# Patient Record
Sex: FEMALE | Race: WHITE | Employment: UNEMPLOYED | URBAN - NONMETROPOLITAN AREA
[De-identification: names, ages, dates, MRNs, and addresses within clinical notes are randomized per-mention and may not be internally consistent; named-entity substitution may affect disease eponyms.]

---

## 2015-10-07 LAB — DIABETIC RETINOPATHY: NEGATIVE

## 2018-09-24 ENCOUNTER — OFFICE VISIT (OUTPATIENT)
Dept: SURGERY | Age: 58
End: 2018-09-24
Payer: COMMERCIAL

## 2018-09-24 VITALS
HEART RATE: 86 BPM | TEMPERATURE: 98 F | DIASTOLIC BLOOD PRESSURE: 85 MMHG | HEIGHT: 69 IN | WEIGHT: 204.2 LBS | OXYGEN SATURATION: 99 % | BODY MASS INDEX: 30.24 KG/M2 | SYSTOLIC BLOOD PRESSURE: 131 MMHG

## 2018-09-24 DIAGNOSIS — K22.70 BARRETT'S ESOPHAGUS WITHOUT DYSPLASIA: ICD-10-CM

## 2018-09-24 DIAGNOSIS — Z12.11 ENCOUNTER FOR SCREENING COLONOSCOPY: ICD-10-CM

## 2018-09-24 DIAGNOSIS — K21.00 GERD WITH ESOPHAGITIS: Primary | ICD-10-CM

## 2018-09-24 PROCEDURE — 99204 OFFICE O/P NEW MOD 45 MIN: CPT | Performed by: TRANSPLANT SURGERY

## 2018-09-24 RX ORDER — RANITIDINE 150 MG/1
300 TABLET ORAL DAILY
COMMUNITY
End: 2020-02-11

## 2018-09-24 RX ORDER — LEVOTHYROXINE SODIUM 137 UG/1
137 CAPSULE ORAL DAILY
COMMUNITY
End: 2019-05-30 | Stop reason: SDUPTHER

## 2018-09-24 RX ORDER — LOSARTAN POTASSIUM 50 MG/1
50 TABLET ORAL DAILY
COMMUNITY
End: 2019-05-30 | Stop reason: SDUPTHER

## 2018-09-24 RX ORDER — OMEPRAZOLE 20 MG/1
20 CAPSULE, DELAYED RELEASE ORAL DAILY
COMMUNITY
Start: 2018-07-18 | End: 2019-05-30 | Stop reason: SDUPTHER

## 2018-09-24 RX ORDER — ATORVASTATIN CALCIUM 20 MG/1
20 TABLET, FILM COATED ORAL NIGHTLY
COMMUNITY
End: 2019-05-30 | Stop reason: SDUPTHER

## 2018-09-24 ASSESSMENT — ENCOUNTER SYMPTOMS
CONSTIPATION: 1
EYE DISCHARGE: 0
DIARRHEA: 1
ABDOMINAL PAIN: 0
SHORTNESS OF BREATH: 0
DOUBLE VISION: 0
BLOOD IN STOOL: 0
NAUSEA: 0
HEMOPTYSIS: 0
HEARTBURN: 0
BACK PAIN: 0
SPUTUM PRODUCTION: 0
ORTHOPNEA: 0
PHOTOPHOBIA: 0
VOMITING: 0
EYE PAIN: 0

## 2018-09-24 NOTE — PROGRESS NOTES
Hepatobiliary and Pancreatic Surgery Attending History and Physical    Patient's Name/Date of Birth: Lerry Litten / 1960 (37 y.o.)    Date: September 24, 2018     CC: Spain's esophagus with age related screening colonoscopy    HPI:  Patient is a very pleasant 59year old female whom has a history of GERD with esophagitis that has led to [place]'s esophagus. She had an EGD three years ago and is now due for another EGD. She also had a colonoscopy 5 years ago and also due for that. She has a history of diverticulosis but no diverticulitis. She denies a family history of colon cancer. She does have alternating bouts of diarrhea and constipation and this normal for her. She states that she has never had a esophagram.    Past Medical History:   Diagnosis Date    High cholesterol     Hypertension     Hyperthyroidism     Type 2 diabetes mellitus without complication (HCC)        Past Surgical History:   Procedure Laterality Date    COLONOSCOPY  2015    FOOT SURGERY      x3    UPPER GASTROINTESTINAL ENDOSCOPY  2015       Current Outpatient Prescriptions   Medication Sig Dispense Refill    omeprazole (PRILOSEC) 20 MG delayed release capsule Take 20 mg by mouth daily      Insulin Glargine (LANTUS SC) Inject 55 Units into the skin daily      ranitidine (ZANTAC) 150 MG tablet Take 150 mg by mouth 2 times daily      levothyroxine (SYNTHROID) 112 MCG tablet Take 112 mcg by mouth Daily      losartan (COZAAR) 50 MG tablet Take 50 mg by mouth daily       No current facility-administered medications for this visit.         Allergies   Allergen Reactions    Keflex [Cephalexin]      Patient can not remember     Sulfa Antibiotics Hives       Family History   Problem Relation Age of Onset    Breast Cancer Mother     Breast Cancer Sister     Other Brother        Social History     Social History    Marital status:      Spouse name: N/A    Number of children: N/A    Years of education: N/A

## 2018-09-25 ENCOUNTER — TELEPHONE (OUTPATIENT)
Dept: HEMATOLOGY | Age: 58
End: 2018-09-25

## 2018-09-25 NOTE — TELEPHONE ENCOUNTER
I called the patient's insurance [address] [place] [address] and I spoke with Angelena Romberg authorization rep., he stated no authorization for CPT codes 94153, 20016, and 43304. So I called surgery scheduling and I spoke with Oscar Anaya she scheduled the patient at [facility] on 10/8/18 at 1:00 pm for colonoscopy and egd. I then called radiology and I spoke with Guerrero Solomon she scheduled the patient at [facility] on 10/01/18 at 8:30am stating the patient must arrive at 8:00 am.  I spoke with the patient and I informed her that she is scheduled at [facility] on 10/1/18 at 8:00am for her Esophagram, and then I shared with her that her colonoscopy and egd are scheduled at [facility] on 10/8/18 at 1:00 pm and PAT will call with further instructions. Patient confirmed.      Electronically signed by Topher De Guzman on 9/25/18 at 8:20 AM

## 2018-10-01 ENCOUNTER — HOSPITAL ENCOUNTER (OUTPATIENT)
Dept: GENERAL RADIOLOGY | Age: 58
Discharge: HOME | End: 2018-10-03
Payer: COMMERCIAL

## 2018-10-01 DIAGNOSIS — K22.70 BARRETT'S ESOPHAGUS WITHOUT DYSPLASIA: ICD-10-CM

## 2018-10-01 DIAGNOSIS — K21.00 GERD WITH ESOPHAGITIS: ICD-10-CM

## 2018-10-01 PROCEDURE — 6360000004 HC RX CONTRAST MEDICATION: Performed by: RADIOLOGY

## 2018-10-01 PROCEDURE — 2500000003 HC RX 250 WO HCPCS: Performed by: RADIOLOGY

## 2018-10-01 PROCEDURE — 6370000000 HC RX 637 (ALT 250 FOR IP): Performed by: RADIOLOGY

## 2018-10-01 PROCEDURE — 74220 X-RAY XM ESOPHAGUS 1CNTRST: CPT

## 2018-10-01 PROCEDURE — A4641 RADIOPHARM DX AGENT NOC: HCPCS | Performed by: RADIOLOGY

## 2018-10-01 RX ORDER — DULOXETIN HYDROCHLORIDE 60 MG/1
60 CAPSULE, DELAYED RELEASE ORAL NIGHTLY
COMMUNITY
End: 2019-03-26

## 2018-10-01 RX ORDER — AMITRIPTYLINE HYDROCHLORIDE 10 MG/1
10 TABLET, FILM COATED ORAL NIGHTLY
COMMUNITY
End: 2019-03-26

## 2018-10-01 RX ADMIN — ANTACID/ANTIFLATULENT 1 EACH: 380; 550; 10; 10 GRANULE, EFFERVESCENT ORAL at 08:14

## 2018-10-01 RX ADMIN — BARIUM SULFATE 176 G: 960 POWDER, FOR SUSPENSION ORAL at 08:10

## 2018-10-01 RX ADMIN — BARIUM SULFATE 140 ML: 980 POWDER, FOR SUSPENSION ORAL at 08:15

## 2018-10-01 NOTE — PROGRESS NOTES
[facility] PRE-ADMISSION TESTING INSTRUCTIONS    The Preadmission Testing patient is instructed accordingly using the following criteria (check applicable):    ARRIVAL INSTRUCTIONS:  [x] Parking the day of Surgery is located in the Main Entrance lot. Upon entering the door, make an immediate right to the surgery reception desk    [x] Complimentary [address] Park[address] is available Monday through Friday 6 am to 6 pm    [x] Bring photo ID and insurance card    [] Bring in a copy of Living will or Durable Power of  papers. [x] Please be sure to arrange for responsible adult to provide transportation to and from the hospital    [x] Please arrange for responsible adult to be with you for the 24 hour period post procedure due to having anesthesia      GENERAL INSTRUCTIONS:    [x] Nothing by mouth after midnight, including gum, candy, mints or water    [x] You may brush your teeth, but do not swallow any water    [x] Take medications as instructed with 1-2 oz of water    [x] Stop herbal supplements and vitamins 5 days prior to procedure    [x] Follow preop dosing of blood thinners per physician instructions    [] Take 1/2 dose of evening insulin, but no insulin after midnight    [x] No oral diabetic medications after midnight    [x] If diabetic and have low blood sugar or feel symptomatic, take 1-2oz apple juice only    [] Bring inhalers day of surgery    [] Bring C-PAP/ Bi-Pap day of surgery    [] Bring urine specimen day of surgery    [] Shower or bath with soap, lather and rinse well, AM of Surgery, no lotion, powders or creams to surgical site    [x] Follow bowel prep as instructed per surgeon    [x] No tobacco products within 24 hours of surgery     [x] No alcohol or illegal drug use within 24 hours of surgery.     [x] Jewelry, body piercing's, eyeglasses, contact lenses and dentures are not permitted into surgery (bring cases)      [x] Please do not wear any nail polish, make up or hair

## 2018-10-08 ENCOUNTER — ANESTHESIA (OUTPATIENT)
Dept: ENDOSCOPY | Age: 58
End: 2018-10-08
Payer: COMMERCIAL

## 2018-10-08 ENCOUNTER — ANESTHESIA EVENT (OUTPATIENT)
Dept: ENDOSCOPY | Age: 58
End: 2018-10-08
Payer: COMMERCIAL

## 2018-10-08 ENCOUNTER — HOSPITAL ENCOUNTER (OUTPATIENT)
Age: 58
Setting detail: OUTPATIENT SURGERY
Discharge: HOME | End: 2018-10-08
Attending: TRANSPLANT SURGERY | Admitting: TRANSPLANT SURGERY
Payer: COMMERCIAL

## 2018-10-08 VITALS
DIASTOLIC BLOOD PRESSURE: 63 MMHG | SYSTOLIC BLOOD PRESSURE: 95 MMHG | RESPIRATION RATE: 17 BRPM | OXYGEN SATURATION: 98 %

## 2018-10-08 VITALS
WEIGHT: 200 LBS | HEIGHT: 69 IN | DIASTOLIC BLOOD PRESSURE: 58 MMHG | SYSTOLIC BLOOD PRESSURE: 103 MMHG | OXYGEN SATURATION: 97 % | BODY MASS INDEX: 29.62 KG/M2 | TEMPERATURE: 97.8 F | RESPIRATION RATE: 16 BRPM | HEART RATE: 72 BPM

## 2018-10-08 PROBLEM — K22.70 BARRETT'S ESOPHAGUS WITHOUT DYSPLASIA: Status: ACTIVE | Noted: 2018-10-08

## 2018-10-08 LAB — METER GLUCOSE: 148 MG/DL (ref 70–110)

## 2018-10-08 PROCEDURE — 3609009500 HC COLONOSCOPY DIAGNOSTIC OR SCREENING: Performed by: TRANSPLANT SURGERY

## 2018-10-08 PROCEDURE — 88305 TISSUE EXAM BY PATHOLOGIST: CPT

## 2018-10-08 PROCEDURE — 88342 IMHCHEM/IMCYTCHM 1ST ANTB: CPT

## 2018-10-08 PROCEDURE — 45378 DIAGNOSTIC COLONOSCOPY: CPT | Performed by: TRANSPLANT SURGERY

## 2018-10-08 PROCEDURE — 3609012400 HC EGD TRANSORAL BIOPSY SINGLE/MULTIPLE: Performed by: TRANSPLANT SURGERY

## 2018-10-08 PROCEDURE — 7100000010 HC PHASE II RECOVERY - FIRST 15 MIN: Performed by: TRANSPLANT SURGERY

## 2018-10-08 PROCEDURE — 2580000003 HC RX 258: Performed by: NURSE ANESTHETIST, CERTIFIED REGISTERED

## 2018-10-08 PROCEDURE — 2709999900 HC NON-CHARGEABLE SUPPLY: Performed by: TRANSPLANT SURGERY

## 2018-10-08 PROCEDURE — 3700000000 HC ANESTHESIA ATTENDED CARE: Performed by: TRANSPLANT SURGERY

## 2018-10-08 PROCEDURE — 43239 EGD BIOPSY SINGLE/MULTIPLE: CPT | Performed by: TRANSPLANT SURGERY

## 2018-10-08 PROCEDURE — 82962 GLUCOSE BLOOD TEST: CPT

## 2018-10-08 PROCEDURE — 3700000001 HC ADD 15 MINUTES (ANESTHESIA): Performed by: TRANSPLANT SURGERY

## 2018-10-08 PROCEDURE — 6360000002 HC RX W HCPCS: Performed by: NURSE ANESTHETIST, CERTIFIED REGISTERED

## 2018-10-08 PROCEDURE — 2500000003 HC RX 250 WO HCPCS: Performed by: NURSE ANESTHETIST, CERTIFIED REGISTERED

## 2018-10-08 PROCEDURE — 7100000011 HC PHASE II RECOVERY - ADDTL 15 MIN: Performed by: TRANSPLANT SURGERY

## 2018-10-08 RX ORDER — SODIUM CHLORIDE 9 MG/ML
INJECTION, SOLUTION INTRAVENOUS CONTINUOUS PRN
Status: DISCONTINUED | OUTPATIENT
Start: 2018-10-08 | End: 2018-10-08 | Stop reason: SDUPTHER

## 2018-10-08 RX ORDER — 0.9 % SODIUM CHLORIDE 0.9 %
10 VIAL (ML) INJECTION PRN
Status: DISCONTINUED | OUTPATIENT
Start: 2018-10-08 | End: 2018-10-08 | Stop reason: HOSPADM

## 2018-10-08 RX ORDER — FENTANYL CITRATE 50 UG/ML
INJECTION, SOLUTION INTRAMUSCULAR; INTRAVENOUS PRN
Status: DISCONTINUED | OUTPATIENT
Start: 2018-10-08 | End: 2018-10-08 | Stop reason: SDUPTHER

## 2018-10-08 RX ORDER — 0.9 % SODIUM CHLORIDE 0.9 %
10 VIAL (ML) INJECTION EVERY 12 HOURS SCHEDULED
Status: DISCONTINUED | OUTPATIENT
Start: 2018-10-08 | End: 2018-10-08 | Stop reason: HOSPADM

## 2018-10-08 RX ORDER — PROPOFOL 10 MG/ML
INJECTION, EMULSION INTRAVENOUS PRN
Status: DISCONTINUED | OUTPATIENT
Start: 2018-10-08 | End: 2018-10-08 | Stop reason: SDUPTHER

## 2018-10-08 RX ADMIN — GLUCAGON HYDROCHLORIDE 0.5 MG: KIT at 12:27

## 2018-10-08 RX ADMIN — PROPOFOL 50 MG: 10 INJECTION, EMULSION INTRAVENOUS at 12:45

## 2018-10-08 RX ADMIN — PROPOFOL 50 MG: 10 INJECTION, EMULSION INTRAVENOUS at 12:38

## 2018-10-08 RX ADMIN — PROPOFOL 50 MG: 10 INJECTION, EMULSION INTRAVENOUS at 12:20

## 2018-10-08 RX ADMIN — FENTANYL CITRATE 50 MCG: 50 INJECTION, SOLUTION INTRAMUSCULAR; INTRAVENOUS at 12:14

## 2018-10-08 RX ADMIN — FENTANYL CITRATE 50 MCG: 50 INJECTION, SOLUTION INTRAMUSCULAR; INTRAVENOUS at 12:24

## 2018-10-08 RX ADMIN — SODIUM CHLORIDE: 9 INJECTION, SOLUTION INTRAVENOUS at 11:31

## 2018-10-08 RX ADMIN — PROPOFOL 50 MG: 10 INJECTION, EMULSION INTRAVENOUS at 12:30

## 2018-10-08 RX ADMIN — PROPOFOL 100 MG: 10 INJECTION, EMULSION INTRAVENOUS at 12:14

## 2018-10-08 ASSESSMENT — PULMONARY FUNCTION TESTS
PIF_VALUE: 0

## 2018-10-08 ASSESSMENT — PAIN SCALES - GENERAL
PAINLEVEL_OUTOF10: 0

## 2018-10-08 ASSESSMENT — PAIN DESCRIPTION - DESCRIPTORS: DESCRIPTORS: CRAMPING

## 2018-10-08 ASSESSMENT — PAIN - FUNCTIONAL ASSESSMENT: PAIN_FUNCTIONAL_ASSESSMENT: 0-10

## 2018-10-08 NOTE — ANESTHESIA PRE PROCEDURE
Allergies: Allergies   Allergen Reactions    Sulfa Antibiotics Hives, Itching and Other (See Comments)     Big time redness    Keflex [Cephalexin] Itching and Rash       Problem List:  There is no problem list on file for this patient. Past Medical History:        Diagnosis Date    Chronic back pain     Constipation     Diarrhea     High cholesterol     Hypertension     Hyperthyroidism     Neuropathy     Type 2 diabetes mellitus without complication ([facility] [address]        Past Surgical History:        Procedure Laterality Date    COLONOSCOPY  2015    FOOT SURGERY      x3    UPPER GASTROINTESTINAL ENDOSCOPY  2015       Social History:    Social History   Substance Use Topics    Smoking status: Never Smoker    Smokeless tobacco: Never Used    Alcohol use No                                Counseling given: Not Answered      Vital Signs (Current):   Vitals:    10/01/18 1153 10/08/18 1055   BP:  136/85   Pulse:  86   Resp:  14   Temp:  97.5 °F (36.4 °C)   TempSrc:  Temporal   SpO2:  99%   Weight: 200 lb (90.7 kg) 200 lb (90.7 kg)   Height: 5' 9\" (1.753 m) 5' 9\" (1.753 m)                                              BP Readings from Last 3 Encounters:   10/08/18 136/85   09/24/18 131/85       NPO Status: Time of last liquid consumption: 1800                        Time of last solid consumption: 0700                        Date of last liquid consumption: 10/07/18                        Date of last solid food consumption: 10/07/18    BMI:   Wt Readings from Last 3 Encounters:   10/08/18 200 lb (90.7 kg)   09/24/18 204 lb 3.2 oz (92.6 kg)     Body mass index is 29.53 kg/m². CBC: No results found for: WBC, RBC, HGB, HCT, MCV, RDW, PLT    CMP: No results found for: NA, K, CL, CO2, BUN, CREATININE, GFRAA, AGRATIO, LABGLOM, GLUCOSE, PROT, CALCIUM, BILITOT, ALKPHOS, AST, ALT    POC Tests: No results for input(s): POCGLU, POCNA, POCK, POCCL, POCBUN, POCHEMO, POCHCT in the last 72 hours.     Coags: No

## 2018-10-09 NOTE — ANESTHESIA POSTPROCEDURE EVALUATION
Department of Anesthesiology  Postprocedure Note    Patient: Ricky Kuhn  MRN: [id number]  YOB: 1960  Date of evaluation: 10/9/2018  Time:  10:11 AM     Procedure Summary     Date:  10/08/18 Room / Location:  [facility] / [facility] ENDOSCOPY    Anesthesia Start:  1147 Anesthesia Stop:  5527    Procedures:       COLONOSCOPY SCREENING POSSIBLE BIOPSY POSSIBLE POLYPECTOMY POSSIBLE CLIPPING (N/A )      EGD BIOPSY (N/A ) Diagnosis:  (CARIAS'S ESOPHAGUS WITHOUT DYSPLASIA SCREENING )    Surgeon:  Erik Ray MD Responsible Provider:  April Desir DO    Anesthesia Type:  MAC ASA Status:  3          Anesthesia Type: MAC    London Phase I: London Score: 10    London Phase II: London Score: 10    Last vitals: Reviewed and per EMR flowsheets.        Anesthesia Post Evaluation    Patient location during evaluation: PACU  Patient participation: complete - patient participated  Level of consciousness: awake and alert  Pain score: 1  Airway patency: patent  Nausea & Vomiting: no nausea and no vomiting  Complications: no  Cardiovascular status: hemodynamically stable  Respiratory status: acceptable  Hydration status: euvolemic

## 2018-11-05 ENCOUNTER — OFFICE VISIT (OUTPATIENT)
Dept: SURGERY | Age: 58
End: 2018-11-05
Payer: COMMERCIAL

## 2018-11-05 VITALS
OXYGEN SATURATION: 98 % | BODY MASS INDEX: 30.48 KG/M2 | SYSTOLIC BLOOD PRESSURE: 138 MMHG | HEIGHT: 69 IN | WEIGHT: 205.8 LBS | HEART RATE: 101 BPM | TEMPERATURE: 97.8 F | DIASTOLIC BLOOD PRESSURE: 87 MMHG

## 2018-11-05 DIAGNOSIS — K22.70 BARRETT'S ESOPHAGUS DETERMINED BY BIOPSY: Primary | ICD-10-CM

## 2018-11-05 PROCEDURE — 99212 OFFICE O/P EST SF 10 MIN: CPT | Performed by: TRANSPLANT SURGERY

## 2019-01-03 LAB
AVERAGE GLUCOSE: NORMAL
HBA1C MFR BLD: 10.5 %

## 2019-03-26 ENCOUNTER — OFFICE VISIT (OUTPATIENT)
Dept: ENDOCRINOLOGY | Age: 59
End: 2019-03-26
Payer: COMMERCIAL

## 2019-03-26 VITALS
HEART RATE: 109 BPM | OXYGEN SATURATION: 97 % | WEIGHT: 200.2 LBS | RESPIRATION RATE: 16 BRPM | SYSTOLIC BLOOD PRESSURE: 138 MMHG | BODY MASS INDEX: 29.65 KG/M2 | DIASTOLIC BLOOD PRESSURE: 82 MMHG | HEIGHT: 69 IN

## 2019-03-26 DIAGNOSIS — E55.9 VITAMIN D DEFICIENCY: ICD-10-CM

## 2019-03-26 DIAGNOSIS — E03.9 HYPOTHYROIDISM, UNSPECIFIED TYPE: ICD-10-CM

## 2019-03-26 DIAGNOSIS — E10.8 TYPE 1 DIABETES MELLITUS WITH COMPLICATION (HCC): Primary | ICD-10-CM

## 2019-03-26 LAB
BUN BLDV-MCNC: NORMAL MG/DL
CALCIUM SERPL-MCNC: 9.3 MG/DL
CHLORIDE BLD-SCNC: NORMAL MMOL/L
CO2: NORMAL MMOL/L
CREAT SERPL-MCNC: 1.09 MG/DL
GFR CALCULATED: NORMAL
GLUCOSE BLD-MCNC: NORMAL MG/DL
POTASSIUM SERPL-SCNC: 4.5 MMOL/L
SODIUM BLD-SCNC: 138 MMOL/L

## 2019-03-26 PROCEDURE — 99204 OFFICE O/P NEW MOD 45 MIN: CPT | Performed by: INTERNAL MEDICINE

## 2019-03-26 RX ORDER — INSULIN GLARGINE 100 [IU]/ML
55 INJECTION, SOLUTION SUBCUTANEOUS EVERY MORNING
COMMUNITY
End: 2019-03-26 | Stop reason: SDUPTHER

## 2019-03-26 RX ORDER — GLUCOSAMINE HCL/CHONDROITIN SU 500-400 MG
CAPSULE ORAL
Qty: 250 STRIP | Refills: 5 | Status: SHIPPED | OUTPATIENT
Start: 2019-03-26 | End: 2019-11-13

## 2019-03-26 RX ORDER — INSULIN GLARGINE 100 [IU]/ML
55 INJECTION, SOLUTION SUBCUTANEOUS EVERY MORNING
Qty: 6 VIAL | Refills: 12 | Status: SHIPPED | OUTPATIENT
Start: 2019-03-26 | End: 2019-06-27 | Stop reason: SDUPTHER

## 2019-03-27 DIAGNOSIS — E10.8 TYPE 1 DIABETES MELLITUS WITH COMPLICATION (HCC): ICD-10-CM

## 2019-04-03 ENCOUNTER — TELEPHONE (OUTPATIENT)
Dept: ENDOCRINOLOGY | Age: 59
End: 2019-04-03

## 2019-04-04 NOTE — TELEPHONE ENCOUNTER
· Decrease lantus 45 units daily to 38 units daily  ·  continue Humalog 10 units before meals  · Change sliding scale below meals to   Blood sugar 150-200: add 2 Units of Humalog  Blood sugar 201-250 : Add 4 Units of Humalog  Blood sugar 251 - 300: Add 6 Units of Humalog  Blood sugar 301 - 350 : Add 8 Units of Humalog  Blood sugar >350 :  Add 10 Units of Humalog

## 2019-04-08 LAB
AVERAGE GLUCOSE: NORMAL
CREATININE, URINE: 282
HBA1C MFR BLD: 10 %
MICROALBUMIN/CREAT 24H UR: 5.3 MG/G{CREAT}
MICROALBUMIN/CREAT UR-RTO: 19
T4 FREE: 1.5
TSH SERPL DL<=0.05 MIU/L-ACNC: 2.91 UIU/ML

## 2019-04-09 ENCOUNTER — TELEPHONE (OUTPATIENT)
Dept: ENDOCRINOLOGY | Age: 59
End: 2019-04-09

## 2019-04-09 DIAGNOSIS — E10.8 TYPE 1 DIABETES MELLITUS WITH COMPLICATION (HCC): ICD-10-CM

## 2019-04-09 DIAGNOSIS — E55.9 VITAMIN D DEFICIENCY: ICD-10-CM

## 2019-04-09 DIAGNOSIS — E03.9 HYPOTHYROIDISM, UNSPECIFIED TYPE: ICD-10-CM

## 2019-04-09 DIAGNOSIS — E55.9 VITAMIN D DEFICIENCY: Primary | ICD-10-CM

## 2019-04-10 ENCOUNTER — TELEPHONE (OUTPATIENT)
Dept: ENDOCRINOLOGY | Age: 59
End: 2019-04-10

## 2019-04-10 NOTE — TELEPHONE ENCOUNTER
Notify pt,  I have reviewed your recent lab results    · A1c was high, please continue sending us your sugar log to adjust diabetes medications   · VitD level was extremely low. Take Vitamin D 50.000 units one tab once a week for 12 weeks. At the end of 12 weeks, start taking Vitamin D3 5000 units daily over the counter.  Prescription for weekly vitD sent to the pharmacy  · Thyroid hormones at goal, continue current dose of thyroid medication

## 2019-05-29 ASSESSMENT — ENCOUNTER SYMPTOMS
CHEST TIGHTNESS: 0
BLOOD IN STOOL: 0
TROUBLE SWALLOWING: 0
ABDOMINAL PAIN: 0
SHORTNESS OF BREATH: 0

## 2019-05-29 NOTE — PROGRESS NOTES
19  Name: Nae Martinez  :1960   Sex:female   Age:58 y.o. Subjective:  Chief Complaint:  The patient presents for hypertension, hyperlipidemia,diabetes and hypothyroidism. Patient presents for recheck of hypertension. Denies chest pain, edema, fatigue, palpitations and syncope. Compliance reviewed. No medication side effects noted. Denies associated dyspnea, edema, fatigue, orthopnea, chest pain and palpitations. We had referred pt to endocrinology last visit for diabetes and to a surgeon for chronic back pain. Pt c/o itching sensation in her hands/face and legs pt has been on gabapentin in the past and is unable to tolerate this medication. lyrica is too expensive       Review of Systems   Constitutional: Negative for activity change, appetite change, fatigue and unexpected weight change. HENT: Negative for congestion, dental problem and trouble swallowing. Eyes: Negative for visual disturbance. Respiratory: Negative for chest tightness and shortness of breath. Cardiovascular: Negative for chest pain, palpitations and leg swelling. Gastrointestinal: Negative for abdominal pain and blood in stool. Endocrine: Negative for cold intolerance, heat intolerance, polydipsia and polyuria. Genitourinary: Negative for difficulty urinating, hematuria and menstrual problem. Musculoskeletal: Positive for back pain. Negative for arthralgias, gait problem, joint swelling and myalgias. Allergic/Immunologic: Negative for environmental allergies and food allergies. Neurological: Negative for syncope, weakness, light-headedness and numbness. Hematological: Negative for adenopathy. Psychiatric/Behavioral: The patient is not nervous/anxious.          NOT DEPRESSED          Current Outpatient Medications:     Cholecalciferol (VITAMIN D3) 1000 units CAPS, Take by mouth, Disp: , Rfl:     Levothyroxine Sodium 137 MCG CAPS, Take 137 mcg by mouth Daily, Disp: 90 capsule, Rfl: 1   omeprazole (PRILOSEC) 20 MG delayed release capsule, Take 1 capsule by mouth daily, Disp: 90 capsule, Rfl: 1    atorvastatin (LIPITOR) 20 MG tablet, Take 1 tablet by mouth nightly, Disp: 90 tablet, Rfl: 1    losartan (COZAAR) 50 MG tablet, Take 1 tablet by mouth daily, Disp: 90 tablet, Rfl: 1    insulin glargine (LANTUS) 100 UNIT/ML injection vial, Inject 55 Units into the skin every morning (Patient taking differently: Inject 45 Units into the skin every morning ), Disp: 6 vial, Rfl: 12    insulin lispro (HUMALOG KWIKPEN) 100 UNIT/ML pen, Take 10 units before meals + sliding scale. MAX 40 units a day (Patient taking differently: Take 11 units before meals + sliding scale. MAX 40 units a day), Disp: 15 pen, Rfl: 5    blood glucose monitor strips, One-Touch Ultra mini strips.  Check 4 times/day before meals and at bedtime and as needed for symptoms of irregular blood glucose, Disp: 250 strip, Rfl: 5    ranitidine (ZANTAC) 150 MG tablet, Take 300 mg by mouth daily Take am dos 10/08, Disp: , Rfl:    Allergies   Allergen Reactions    Sulfa Antibiotics Hives, Itching and Other (See Comments)     Big time redness    Keflex [Cephalexin] Itching and Rash       Past Medical History:   Diagnosis Date    Chronic back pain     Constipation     Diarrhea     High cholesterol     Hypertension     Hyperthyroidism     Neuropathy     Type 2 diabetes mellitus without complication [facility])      Health Maintenance Due   Topic Date Due    Hepatitis C screen  1960    Pneumococcal 0-64 years Vaccine (1 of 1 - PPSV23) 10/17/1964    Diabetic foot exam  10/17/1968    Diabetic retinal exam  10/17/1968    Lipid screen  10/17/1968    HIV screen  10/17/1973    DTaP/Tdap/Td vaccine (1 - Tdap) 10/17/1977    Cervical cancer screen  10/17/1979    Breast cancer screen  10/17/1998    Shingles Vaccine (1 of 2) 10/17/2008      Patient Active Problem List   Diagnosis    [place]'s esophagus without dysplasia      Past Surgical History:   Procedure Laterality Date    COLONOSCOPY  2015    FOOT SURGERY      x3    [facility] COLONOSCOPY FLX DX W/COLLJ SPEC WHEN PFRMD N/A 10/8/2018    COLONOSCOPY SCREENING POSSIBLE BIOPSY POSSIBLE POLYPECTOMY POSSIBLE CLIPPING performed by Nery Venegas MD at [address] [place] [address],[address]  2015    UPPER GASTROINTESTINAL ENDOSCOPY N/A 10/8/2018    EGD BIOPSY performed by Nery Venegas MD at [facility] ENDOSCOPY     Family History   Problem Relation Age of Onset   [de-identified] Breast Cancer Mother     Breast Cancer Sister     Other Brother     Diabetes Maternal Grandmother     Diabetes Paternal Cousin      Social History     Tobacco History     Smoking Status  Never Smoker    Smokeless Tobacco Use  Never Used          Alcohol History     Alcohol Use Status  No          Drug Use     Drug Use Status  No          Sexual Activity     Sexually Active  Not Asked                Objective  Vitals:    05/30/19 0847 05/30/19 0904   BP: 130/82 138/84   Pulse: 79    Temp: 97.9 °F (36.6 °C)    Weight: 202 lb 2 oz (91.7 kg)    Height: 5' 9\" (1.753 m)         Physical Exam   Constitutional: She is oriented to person, place, and time. She appears well-developed and well-nourished. Eyes: Pupils are equal, round, and reactive to light. EOM are normal.   Neck: Normal range of motion. Neck supple. Cardiovascular: Normal rate, regular rhythm and normal heart sounds. Pulmonary/Chest: Effort normal and breath sounds normal.   Abdominal: Soft. Musculoskeletal: Normal range of motion. She exhibits tenderness. Tenderness lower lumbar    Neurological: She is alert and oriented to person, place, and time. Skin: Skin is warm and dry. Vasile Guillen was seen today for diabetes and hyperlipidemia. Diagnoses and all orders for this visit:    Essential hypertension  Comments:  stable   Orders:  -     losartan (COZAAR) 50 MG tablet;  Take 1 tablet by mouth daily    Hyperlipidemia, unspecified hyperlipidemia type  Comments:  due for lab today   Orders:  -     atorvastatin (LIPITOR) 20 MG tablet; Take 1 tablet by mouth nightly  -     Lipid Panel; Future    Type 2 diabetes mellitus with hyperglycemia, with long-term current use of insulin ([facility])  Comments:  a1c 10.0  Orders:  -      DIABETES FOOT EXAM    Hypothyroidism, unspecified type  Comments:  tsh 2.91  t4,free 1.5  Orders:  -     Levothyroxine Sodium 137 MCG CAPS; Take 137 mcg by mouth Daily    Lumbar radiculopathy  Comments:  disability questionnaire filler out today   did not schedule with surgeon     Unsteady gait    [place]'s esophagus with dysplasia  Comments:  continue ranitidine and omeprazole   Orders:  -     omeprazole (PRILOSEC) 20 MG delayed release capsule; Take 1 capsule by mouth daily        Return in about 6 months (around 11/30/2019). Diana Chinchilla DO   This note has been transcribed by Juanita Farley under the direction of Dr. Robbie Godwin. Dr. Marcella Sanchez has reviewed this note for accuracy. I, Dr. Marcella Sanchez, personally performed the services described in this documentation as scribed by Juanita Farley in my presence, and it is both accurate and complete.

## 2019-05-30 ENCOUNTER — OFFICE VISIT (OUTPATIENT)
Dept: FAMILY MEDICINE CLINIC | Age: 59
End: 2019-05-30
Payer: COMMERCIAL

## 2019-05-30 VITALS
HEIGHT: 69 IN | WEIGHT: 202.13 LBS | TEMPERATURE: 97.9 F | DIASTOLIC BLOOD PRESSURE: 84 MMHG | HEART RATE: 79 BPM | SYSTOLIC BLOOD PRESSURE: 138 MMHG | BODY MASS INDEX: 29.94 KG/M2

## 2019-05-30 DIAGNOSIS — R26.81 UNSTEADY GAIT: ICD-10-CM

## 2019-05-30 DIAGNOSIS — E11.65 TYPE 2 DIABETES MELLITUS WITH HYPERGLYCEMIA, WITH LONG-TERM CURRENT USE OF INSULIN (HCC): ICD-10-CM

## 2019-05-30 DIAGNOSIS — I10 ESSENTIAL HYPERTENSION: Primary | ICD-10-CM

## 2019-05-30 DIAGNOSIS — K22.719 BARRETT'S ESOPHAGUS WITH DYSPLASIA: ICD-10-CM

## 2019-05-30 DIAGNOSIS — Z79.4 TYPE 2 DIABETES MELLITUS WITH HYPERGLYCEMIA, WITH LONG-TERM CURRENT USE OF INSULIN (HCC): ICD-10-CM

## 2019-05-30 DIAGNOSIS — M54.16 LUMBAR RADICULOPATHY: ICD-10-CM

## 2019-05-30 DIAGNOSIS — E03.9 HYPOTHYROIDISM, UNSPECIFIED TYPE: ICD-10-CM

## 2019-05-30 DIAGNOSIS — E78.5 HYPERLIPIDEMIA, UNSPECIFIED HYPERLIPIDEMIA TYPE: ICD-10-CM

## 2019-05-30 PROCEDURE — 99214 OFFICE O/P EST MOD 30 MIN: CPT | Performed by: FAMILY MEDICINE

## 2019-05-30 RX ORDER — OMEPRAZOLE 20 MG/1
20 CAPSULE, DELAYED RELEASE ORAL DAILY
Qty: 90 CAPSULE | Refills: 1 | Status: SHIPPED
Start: 2019-05-30 | End: 2020-02-11 | Stop reason: SDUPTHER

## 2019-05-30 RX ORDER — LOSARTAN POTASSIUM 50 MG/1
50 TABLET ORAL DAILY
Qty: 90 TABLET | Refills: 1 | Status: SHIPPED
Start: 2019-05-30 | End: 2020-02-11 | Stop reason: SDUPTHER

## 2019-05-30 RX ORDER — ATORVASTATIN CALCIUM 20 MG/1
20 TABLET, FILM COATED ORAL NIGHTLY
Qty: 90 TABLET | Refills: 1 | Status: SHIPPED
Start: 2019-05-30 | End: 2020-02-11 | Stop reason: SDUPTHER

## 2019-05-30 RX ORDER — LEVOTHYROXINE SODIUM 137 UG/1
137 CAPSULE ORAL DAILY
Qty: 90 CAPSULE | Refills: 1 | Status: SHIPPED | OUTPATIENT
Start: 2019-05-30 | End: 2019-12-04 | Stop reason: SDUPTHER

## 2019-05-30 ASSESSMENT — PATIENT HEALTH QUESTIONNAIRE - PHQ9
2. FEELING DOWN, DEPRESSED OR HOPELESS: 1
1. LITTLE INTEREST OR PLEASURE IN DOING THINGS: 0
SUM OF ALL RESPONSES TO PHQ9 QUESTIONS 1 & 2: 1
SUM OF ALL RESPONSES TO PHQ QUESTIONS 1-9: 1
SUM OF ALL RESPONSES TO PHQ QUESTIONS 1-9: 1

## 2019-05-30 ASSESSMENT — ENCOUNTER SYMPTOMS: BACK PAIN: 1

## 2019-06-17 ENCOUNTER — OFFICE VISIT (OUTPATIENT)
Dept: SURGERY | Age: 59
End: 2019-06-17
Payer: COMMERCIAL

## 2019-06-17 VITALS
OXYGEN SATURATION: 98 % | SYSTOLIC BLOOD PRESSURE: 135 MMHG | TEMPERATURE: 98.4 F | HEIGHT: 69 IN | DIASTOLIC BLOOD PRESSURE: 91 MMHG | HEART RATE: 84 BPM | BODY MASS INDEX: 29.96 KG/M2 | WEIGHT: 202.3 LBS

## 2019-06-17 DIAGNOSIS — K29.91: Primary | ICD-10-CM

## 2019-06-17 DIAGNOSIS — K29.71: Primary | ICD-10-CM

## 2019-06-17 PROCEDURE — 99212 OFFICE O/P EST SF 10 MIN: CPT | Performed by: TRANSPLANT SURGERY

## 2019-06-27 ENCOUNTER — OFFICE VISIT (OUTPATIENT)
Dept: ENDOCRINOLOGY | Age: 59
End: 2019-06-27
Payer: COMMERCIAL

## 2019-06-27 VITALS
SYSTOLIC BLOOD PRESSURE: 128 MMHG | DIASTOLIC BLOOD PRESSURE: 88 MMHG | HEIGHT: 69 IN | OXYGEN SATURATION: 98 % | RESPIRATION RATE: 16 BRPM | BODY MASS INDEX: 29.59 KG/M2 | HEART RATE: 94 BPM | WEIGHT: 199.8 LBS

## 2019-06-27 DIAGNOSIS — E03.9 HYPOTHYROIDISM, UNSPECIFIED TYPE: Primary | ICD-10-CM

## 2019-06-27 PROCEDURE — 99214 OFFICE O/P EST MOD 30 MIN: CPT | Performed by: INTERNAL MEDICINE

## 2019-06-27 RX ORDER — FLASH GLUCOSE SCANNING READER
EACH MISCELLANEOUS
Qty: 1 DEVICE | Refills: 0 | Status: SHIPPED | OUTPATIENT
Start: 2019-06-27 | End: 2019-11-13

## 2019-06-27 RX ORDER — BLOOD SUGAR DIAGNOSTIC
1 STRIP MISCELLANEOUS DAILY
Qty: 100 EACH | Refills: 3 | Status: SHIPPED | OUTPATIENT
Start: 2019-06-27 | End: 2019-11-13

## 2019-06-27 RX ORDER — FLASH GLUCOSE SENSOR
KIT MISCELLANEOUS
Qty: 2 EACH | Refills: 5 | Status: SHIPPED | OUTPATIENT
Start: 2019-06-27 | End: 2019-06-27

## 2019-06-27 RX ORDER — FLASH GLUCOSE SCANNING READER
EACH MISCELLANEOUS
Qty: 1 DEVICE | Refills: 0 | Status: SHIPPED | OUTPATIENT
Start: 2019-06-27 | End: 2019-06-27

## 2019-06-27 RX ORDER — INSULIN GLARGINE 100 [IU]/ML
35 INJECTION, SOLUTION SUBCUTANEOUS EVERY MORNING
Qty: 12 VIAL | Refills: 5 | Status: SHIPPED
Start: 2019-06-27 | End: 2020-04-09 | Stop reason: SDUPTHER

## 2019-06-27 RX ORDER — FLASH GLUCOSE SENSOR
KIT MISCELLANEOUS
Qty: 2 EACH | Refills: 5 | Status: SHIPPED | OUTPATIENT
Start: 2019-06-27 | End: 2019-11-13

## 2019-06-27 NOTE — PROGRESS NOTES
ENDOCRINOLOGY CLINIC NOTE    Date of Service: 6/27/2019    Primary Care Physician: Ernst Mullen DO  Referring physician: No ref. provider found  Provider: Robin Morris MD     Reason for the visit:  Poorly controlled insulin dependent DM, Hypothyroidism     History of Present Illness: The history is provided by the patient. No  was used. Accuracy of the patient data is excellent. Ival Peabody is a very pleasant 59 y.o. female seen in Endocrine clinic today for diabetes management     Ival Peabody was diagnosed with diabetes at age 25 and currently on Lantus 45 units in the morning and Humalog 11 units before meals + scale   The patient has been checking blood sugar 3 a day before meals. Still experiencing low BS especially in early morning. Most recent A1c results summarized below  Lab Results   Component Value Date    LABA1C 10.0 04/08/2019    LABA1C 10.5 01/03/2019     Patient reported hypoglycemic episodes both fasting and post prandal. She lost conscious dropped to 30s   The patient has been mindful of what has been eating and wasn't following diabetes diet as encouraged   I reviewed current medications and the patient has no issues with diabetes medications  Ival Peabody is up to date with eye exam and denied any history of diabetic retinopathy   The patient seeing podiatrist once a year also performs her own feet care  Microvascular complications:  No Retinopathy, no Nephropathy + Neuropathy   Macrovascular complications: no CAD, PVD, or Stroke  The patient receives Flushot every year and up to date with the Pneumonia vaccine     Primary Hypothyroidism   Ival Peabody was diagnsed with hypothyroidism long time ago and has been on thyroid hormone replacement since diagnosis.  She is currently on Levothyroxine 137 mcg daily (dose increased from 125 few months ago)   Patient takes levothyroxine in the morning at empty stomach, wait one hour before eating , avoid multivitamins meetings of clubs or organizations: Not on file     Relationship status: Not on file    Intimate partner violence:     Fear of current or ex partner: Not on file     Emotionally abused: Not on file     Physically abused: Not on file     Forced sexual activity: Not on file   Other Topics Concern    Not on file   Social History Narrative    Not on file     FAMILY HISTORY   Family History   Problem Relation Age of Onset    Breast Cancer Mother     Breast Cancer Sister     Other Brother     Diabetes Maternal Grandmother     Diabetes Paternal Cousin      ALLERGIES AND DRUG REACTIONS   Allergies   Allergen Reactions    Sulfa Antibiotics Hives, Itching and Other (See Comments)     Big time redness    Keflex [Cephalexin] Itching and Rash       CURRENT MEDICATIONS   Current Outpatient Medications   Medication Sig Dispense Refill    insulin glargine (LANTUS) 100 UNIT/ML injection vial Inject 35 Units into the skin every morning 12 vial 5    insulin lispro (HUMALOG KWIKPEN) 100 UNIT/ML pen Take 12 units before meals + sliding scale.  MAX 75 units a day 20 pen 5    Insulin Syringe-Needle U-100 ([employer] INSULIN SYRINGE) 31G X 5/16\" 0.3 ML MISC 1 each by Does not apply route daily 100 each 3    Insulin Pen Needle (BD PEN NEEDLE MICRO U/F) 32G X 6 MM MISC Uses with insulin 4 times a day 250 each 5    Continuous Blood Gluc Sensor (FREESTYLE VINOD 14 DAY SENSOR) MISC Change sensor every 14 days 2 each 5    Continuous Blood Gluc  (FREESTYLE VINOD 14 DAY READER) OMKAR Freestyle Vinod 14 days reader device 1 Device 0    Cholecalciferol (VITAMIN D3) 1000 units CAPS Take by mouth      Levothyroxine Sodium 137 MCG CAPS Take 137 mcg by mouth Daily 90 capsule 1    omeprazole (PRILOSEC) 20 MG delayed release capsule Take 1 capsule by mouth daily 90 capsule 1    atorvastatin (LIPITOR) 20 MG tablet Take 1 tablet by mouth nightly 90 tablet 1    losartan (COZAAR) 50 MG tablet Take 1 tablet by mouth daily 90 tablet 1    blood glucose monitor strips One-Touch Ultra mini strips. Check 4 times/day before meals and at bedtime and as needed for symptoms of irregular blood glucose 250 strip 5    ranitidine (ZANTAC) 150 MG tablet Take 300 mg by mouth daily Take am dos 10/08       No current facility-administered medications for this visit. Review of Systems  Constitutional: No fever, no chills, no diaphoresis, no generalized weakness. HEENT: No blurred vision, No sore throat, no ear pain, no hair loss  Neck: denied any neck swelling, difficulty swallowing,   Cardio-pulmonary: No CP, SOB or palpitation, No orthopnea or PND. No cough or wheezing. GI: No N/V/D, no constipation, No abdominal pain, no melena or hematochezia   : Denied any dysuria, hematuria, flank pain, discharge, or incontinence. Skin: denied any rash, ulcer, Hirsute, or hyperpigmentation. MSK: denied any joint deformity, joint pain/swelling, muscle pain, or back pain. Neuro: no numbness, no tingling, no weakness, _    OBJECTIVE    /88 (Site: Right Upper Arm, Position: Sitting, Cuff Size: Medium Adult)   Pulse 94   Resp 16   Ht 5' 9\" (1.753 m)   Wt 199 lb 12.8 oz (90.6 kg)   LMP  (LMP Unknown)   SpO2 98%   BMI 29.51 kg/m²   BP Readings from Last 4 Encounters:   06/27/19 128/88   06/17/19 (!) 135/91   06/11/19 125/83   05/30/19 138/84     Wt Readings from Last 6 Encounters:   06/27/19 199 lb 12.8 oz (90.6 kg)   06/17/19 202 lb 4.8 oz (91.8 kg)   06/11/19 202 lb (91.6 kg)   05/30/19 202 lb 2 oz (91.7 kg)   03/26/19 200 lb 3.2 oz (90.8 kg)   11/05/18 205 lb 12.8 oz (93.4 kg)       Physical examination:  General: awake alert, oriented x3, no abnormal position or movements. HEENT: normocephalic non-traumatic, no exophthalmos   Neck: supple, no LN enlargement, no thyromegaly, no thyroid tenderness, no JVD. Pulm: Clear equal air entry no added sounds, no wheezing or rhonchi    CVS: S1 + S2, no murmur, no heave.  Dorsalis pedis pulse palpable   Abd: soft lax, no tenderness, no organomegaly, audible bowel sounds. Skin: warm, no lesions, no rash. + callus at head of first metatarsal (Rt>Lt) , no Ulcers, No acanthosis nigricans  Musculoskeletal: No back tenderness, no kyphosis/scoliosis    Neuro: CN intact, Monofilament sensation decreased bilateral , muscle power normal  Psych: normal mood, and affect      Review of Laboratory Data:  I personally reviewed the following lab:  No results found for: WBC, RBC, HGB, HCT, MCV, MCH, MCHC, RDW, PLT, MPV, GRANULOCYTES, BANDS   Lab Results   Component Value Date/Time     03/26/2019    K 4.5 03/26/2019    CREATININE 1.09 03/26/2019    CALCIUM 9.3 03/26/2019      Lab Results   Component Value Date/Time    TSH 2.91 04/08/2019    T4FREE 1.5 04/08/2019     Lab Results   Component Value Date    LABA1C 10.0 04/08/2019    MALBCR 19 04/08/2019    LABCREA 282 04/08/2019     Lab Results   Component Value Date    LABA1C 10.0 04/08/2019    LABA1C 10.5 01/03/2019     No results found for: CHOL, TRIG, HDL  No results found for: VITD25    Medical Records/Labs/Images review:   I personally reviewed and summarized previous records   All labs and imaging studies were independently reviewed     Bijal Rodriguez 12, a 59 y.o.-old female seen in for the following issues     Insulin Dependent Diabetes Mellitus  · Patient's diabetes is uncontrolled   · Will change DM regimen to lantus 35 units daily in the morning, Humalog 12 units before meals + ss 2:50>150   · The patient was advised to check blood sugars 4 times a day before meals and at bedtime and send BS readings to our office in a week.   · Discussed with patient A1c and blood sugar goals   · Optimal blood sugars: 100-140 pre-prandial, < 180 peak post-prandial  · The patient counseled about the complications of uncontrolled diabetes   · Patient was counselled about the importance of self-blood glucose monitoring and eating consistent carb diet to avoid blood sugar fluctuations   · Patient up to date with the routine diabetes maintenance and prevention  · Discussed lifestyle changes including diet and exercise with patient; recommended 150 minutes of moderate intensity exercise per week. · Diabetes labs in few days     Primary hypothyroidism   · At goal, continue levothyroxine 137 mcg daily    Hyperlipidemia  · Continue  satin     VitD deficiency   · Start taking vitD 5000 iu/day     Return in about 4 months (around 10/27/2019) for DM type 1, Hypothyroidism . The above issues were reviewed with the patient who understood and agreed with the plan. Thank you for allowing us to participate in the care of this patient. Please do not hesitate to contact us with any additional questions. Diagnosis Orders   1. Hypothyroidism, unspecified type     2.  IDDM (insulin dependent diabetes mellitus) ([facility])  insulin glargine (LANTUS) 100 UNIT/ML injection vial    insulin lispro (HUMALOG KWIKPEN) 100 UNIT/ML pen    Insulin Syringe-Needle U-100 ([employer] INSULIN SYRINGE) 31G X 5/16\" 0.3 ML MISC    Insulin Pen Needle (BD PEN NEEDLE MICRO U/F) 32G X 6 MM MISC    Continuous Blood Gluc Sensor (FREESTYLE VINOD 14 DAY SENSOR) MISC    Continuous Blood Gluc  (FREESTYLE VINOD 14 DAY READER) Abimbola Woo MD  Endocrinologist, [facility])   [address] [place] [address], [address] [place] [address],[place] [id number]   Phone: [phone]  Fax: [phone]  --------------------------------------------  Electronically signed

## 2019-06-27 NOTE — LETTER
[address] [place] [address] Department of Endocrinology Diabetes and Metabolism   [address], [facility] [address]   Phone: [phone]  Fax: [phone]      Provider: Kenny Hoyos MD  Primary Care Physician: Cheri Hunter DO   Referring Provider: No ref. provider found    Patient: Pablo Vidal  YOB: 1960  Date of Visit: 6/27/2019      Dear Dr. Cheri Hunter DO   I had the pleasure of seeing your patient Pablo Vidal today at endocrine clinic for follow up visit and I enclosed a copy of the office visit completed today. Thank you very much for asking us to participate in the care of this very pleasant patient. Please don't hesitate to call if there are any further questions or concerns. Sincerely   Kenny Hoyos MD  Endocrinologist, [facility])   [address], [address] [facility],[address] [address]   Phone: [phone]  Fax: [phone]      ENDOCRINOLOGY CLINIC NOTE    Date of Service: 6/27/2019    Primary Care Physician: Cheri Hunter DO  Referring physician: No ref. provider found  Provider: Kenny Hoyos MD     Reason for the visit:  Poorly controlled insulin dependent DM, Hypothyroidism     History of Present Illness: The history is provided by the patient. No  was used. Accuracy of the patient data is excellent. Pablo Vidal is a very pleasant 59 y.o. female seen in Endocrine clinic today for diabetes management     Pablo Vidal was diagnosed with diabetes at age 25 and currently on Lantus 45 units in the morning and Humalog 11 units before meals + scale   The patient has been checking blood sugar 3 a day before meals. Still experiencing low BS especially in early morning.    Most recent A1c results summarized below  Lab Results   Component Value Date    LABA1C 10.0 04/08/2019    LABA1C 10.5 01/03/2019     Patient reported hypoglycemic episodes both fasting and post prandal. She lost conscious dropped to 30s  Marital status:      Spouse name: Not on file    Number of children: Not on file    Years of education: Not on file    Highest education level: Not on file   Occupational History    Not on file   Social Needs    Financial resource strain: Not on file    Food insecurity:     Worry: Not on file     Inability: Not on file    Transportation needs:     Medical: Not on file     Non-medical: Not on file   Tobacco Use    Smoking status: Never Smoker    Smokeless tobacco: Never Used   Substance and Sexual Activity    Alcohol use: No    Drug use: No    Sexual activity: Not Currently   Lifestyle    Physical activity:     Days per week: Not on file     Minutes per session: Not on file    Stress: Not on file   Relationships    Social connections:     Talks on phone: Not on file     Gets together: Not on file     Attends [employer] service: Not on file     Active member of club or organization: Not on file     Attends meetings of clubs or organizations: Not on file     Relationship status: Not on file    Intimate partner violence:     Fear of current or ex partner: Not on file     Emotionally abused: Not on file     Physically abused: Not on file     Forced sexual activity: Not on file   Other Topics Concern    Not on file   Social History Narrative    Not on file     FAMILY HISTORY   Family History   Problem Relation Age of Onset    Breast Cancer Mother     Breast Cancer Sister     Other Brother     Diabetes Maternal Grandmother     Diabetes Paternal Cousin      ALLERGIES AND DRUG REACTIONS   Allergies   Allergen Reactions    Sulfa Antibiotics Hives, Itching and Other (See Comments)     Big time redness    Keflex [Cephalexin] Itching and Rash       CURRENT MEDICATIONS   Current Outpatient Medications   Medication Sig Dispense Refill    insulin glargine (LANTUS) 100 UNIT/ML injection vial Inject 35 Units into the skin every morning 12 vial 5  insulin lispro (HUMALOG KWIKPEN) 100 UNIT/ML pen Take 12 units before meals + sliding scale. MAX 75 units a day 20 pen 5    Insulin Syringe-Needle U-100 ([employer] INSULIN SYRINGE) 31G X 5/16\" 0.3 ML MISC 1 each by Does not apply route daily 100 each 3    Insulin Pen Needle (BD PEN NEEDLE MICRO U/F) 32G X 6 MM MISC Uses with insulin 4 times a day 250 each 5    Continuous Blood Gluc Sensor (FREESTYLE VINOD 14 DAY SENSOR) MISC Change sensor every 14 days 2 each 5    Continuous Blood Gluc  (FREESTYLE VINOD 14 DAY READER) OMKAR Freestyle Vinod 14 days reader device 1 Device 0    Cholecalciferol (VITAMIN D3) 1000 units CAPS Take by mouth      Levothyroxine Sodium 137 MCG CAPS Take 137 mcg by mouth Daily 90 capsule 1    omeprazole (PRILOSEC) 20 MG delayed release capsule Take 1 capsule by mouth daily 90 capsule 1    atorvastatin (LIPITOR) 20 MG tablet Take 1 tablet by mouth nightly 90 tablet 1    losartan (COZAAR) 50 MG tablet Take 1 tablet by mouth daily 90 tablet 1    blood glucose monitor strips One-Touch Ultra mini strips. Check 4 times/day before meals and at bedtime and as needed for symptoms of irregular blood glucose 250 strip 5    ranitidine (ZANTAC) 150 MG tablet Take 300 mg by mouth daily Take am dos 10/08       No current facility-administered medications for this visit. Review of Systems  Constitutional: No fever, no chills, no diaphoresis, no generalized weakness. HEENT: No blurred vision, No sore throat, no ear pain, no hair loss  Neck: denied any neck swelling, difficulty swallowing,   Cardio-pulmonary: No CP, SOB or palpitation, No orthopnea or PND. No cough or wheezing. GI: No N/V/D, no constipation, No abdominal pain, no melena or hematochezia   : Denied any dysuria, hematuria, flank pain, discharge, or incontinence. Skin: denied any rash, ulcer, Hirsute, or hyperpigmentation. MSK: denied any joint deformity, joint pain/swelling, muscle pain, or back pain. Neuro: no numbness, no tingling, no weakness, _    OBJECTIVE    /88 (Site: Right Upper Arm, Position: Sitting, Cuff Size: Medium Adult)   Pulse 94   Resp 16   Ht 5' 9\" (1.753 m)   Wt 199 lb 12.8 oz (90.6 kg)   LMP  (LMP Unknown)   SpO2 98%   BMI 29.51 kg/m²    BP Readings from Last 4 Encounters:   06/27/19 128/88   06/17/19 (!) 135/91   06/11/19 125/83   05/30/19 138/84     Wt Readings from Last 6 Encounters:   06/27/19 199 lb 12.8 oz (90.6 kg)   06/17/19 202 lb 4.8 oz (91.8 kg)   06/11/19 202 lb (91.6 kg)   05/30/19 202 lb 2 oz (91.7 kg)   03/26/19 200 lb 3.2 oz (90.8 kg)   11/05/18 205 lb 12.8 oz (93.4 kg)       Physical examination:  General: awake alert, oriented x3, no abnormal position or movements. HEENT: normocephalic non-traumatic, no exophthalmos   Neck: supple, no LN enlargement, no thyromegaly, no thyroid tenderness, no JVD. Pulm: Clear equal air entry no added sounds, no wheezing or rhonchi    CVS: S1 + S2, no murmur, no heave. Dorsalis pedis pulse palpable   Abd: soft lax, no tenderness, no organomegaly, audible bowel sounds.    Skin: warm, no lesions, no rash. + callus at head of first metatarsal (Rt>Lt) , no Ulcers, No acanthosis nigricans  Musculoskeletal: No back tenderness, no kyphosis/scoliosis    Neuro: CN intact, Monofilament sensation decreased bilateral , muscle power normal  Psych: normal mood, and affect      Review of Laboratory Data:  I personally reviewed the following lab:  No results found for: WBC, RBC, HGB, HCT, MCV, MCH, MCHC, RDW, PLT, MPV, GRANULOCYTES, BANDS   Lab Results   Component Value Date/Time     03/26/2019    K 4.5 03/26/2019    CREATININE 1.09 03/26/2019    CALCIUM 9.3 03/26/2019      Lab Results   Component Value Date/Time    TSH 2.91 04/08/2019    T4FREE 1.5 04/08/2019     Lab Results   Component Value Date    LABA1C 10.0 04/08/2019    MALBCR 19 04/08/2019    LABCREA 282 04/08/2019     Lab Results   Component Value Date    LABA1C 10.0 04/08/2019 insulin lispro (HUMALOG KWIKPEN) 100 UNIT/ML pen    Insulin Syringe-Needle U-100 ([employer] INSULIN SYRINGE) 31G X 5/16\" 0.3 ML MISC    Insulin Pen Needle (BD PEN NEEDLE MICRO U/F) 32G X 6 MM MISC    Continuous Blood Gluc Sensor (FREESTYLE VINOD 14 DAY SENSOR) MISC    Continuous Blood Gluc  (FREESTYLE VINOD 14 DAY READER) Guerrero Feldman MD  Endocrinologist, [place] [id number]   Phone: [phone]  Fax: [phone]  --------------------------------------------  Electronically signed

## 2019-06-27 NOTE — PATIENT INSTRUCTIONS
Recommendations for today's visit  · Decrease lantus  to 35 units daily i  · Take Humalog 12 units before meals + slidin the morning ng scale   Blood sugar 150-200: add 2 Units of Humalog  Blood sugar 201-250 : Add 4 Units of Humalog  Blood sugar 251 - 300: Add 6 Units of Humalog  Blood sugar 301 - 350 : Add 8 Units of Humalog  Blood sugar >350 : Add 10 Units of Humalog    · Check Blood sugar 3 times/day before meals and at bedtime and send us sugar log in a week or two  · Start taking vitD3   5000 iu/day over the counter     These are your blood sugar, blood pressure, cholesterol and A1c goals:  · Blood sugar fastin mg/dl to 130 mg/dl  · Blood sugar before meals: <150 mg/dl  · Peak blood sugar lower than 180 mg/dl  · Bad cholesterol (LDL cholesterol): less than 100 mg/dl  · Blood pressure: less than 140/80 mmHg\  · A1c: between 6.5 - 7%      Steps for managing low blood sugar  1. Eat 15 grams of glucose of simple carbohydrate, as found in:   1 tablespoon sugar, Torsten or corn syrup    4 oz (1/2 cup) of juice or regular soda   Glucose Tablet or gel (follow package instruction)   2. Wait 15 min and check blood sugar again   3. Repeat until blood sugar within range  4.  Once within range, follow up with snack or meal within 1 hour      I you have any questions please call Dr. Ja Rea office       Ashlee Farah MD  Endocrinologist, [facility])   [address] [facility] [address]   Phone: [phone]  Fax: [phone]

## 2019-07-11 ENCOUNTER — TELEPHONE (OUTPATIENT)
Dept: FAMILY MEDICINE CLINIC | Age: 59
End: 2019-07-11

## 2019-07-12 RX ORDER — GABAPENTIN 100 MG/1
CAPSULE ORAL
Qty: 90 CAPSULE | Refills: 2 | Status: SHIPPED
Start: 2019-07-12 | End: 2020-02-11

## 2019-08-21 ENCOUNTER — OFFICE VISIT (OUTPATIENT)
Dept: FAMILY MEDICINE CLINIC | Age: 59
End: 2019-08-21
Payer: COMMERCIAL

## 2019-08-21 VITALS
HEART RATE: 78 BPM | RESPIRATION RATE: 18 BRPM | SYSTOLIC BLOOD PRESSURE: 144 MMHG | BODY MASS INDEX: 32.46 KG/M2 | DIASTOLIC BLOOD PRESSURE: 82 MMHG | OXYGEN SATURATION: 97 % | TEMPERATURE: 97.7 F | WEIGHT: 214.2 LBS | HEIGHT: 68 IN

## 2019-08-21 DIAGNOSIS — M54.50 LOW BACK PAIN WITHOUT SCIATICA, UNSPECIFIED BACK PAIN LATERALITY, UNSPECIFIED CHRONICITY: Primary | ICD-10-CM

## 2019-08-21 DIAGNOSIS — Z79.4 TYPE 2 DIABETES MELLITUS WITHOUT COMPLICATION, WITH LONG-TERM CURRENT USE OF INSULIN (HCC): ICD-10-CM

## 2019-08-21 DIAGNOSIS — E11.9 TYPE 2 DIABETES MELLITUS WITHOUT COMPLICATION, WITH LONG-TERM CURRENT USE OF INSULIN (HCC): ICD-10-CM

## 2019-08-21 PROCEDURE — 99213 OFFICE O/P EST LOW 20 MIN: CPT | Performed by: FAMILY MEDICINE

## 2019-08-21 PROCEDURE — 96372 THER/PROPH/DIAG INJ SC/IM: CPT | Performed by: FAMILY MEDICINE

## 2019-08-21 RX ORDER — PREDNISONE 10 MG/1
TABLET ORAL
Qty: 21 TABLET | Refills: 0 | Status: SHIPPED | OUTPATIENT
Start: 2019-08-21 | End: 2019-11-06 | Stop reason: ALTCHOICE

## 2019-08-21 RX ORDER — KETOROLAC TROMETHAMINE 30 MG/ML
30 INJECTION, SOLUTION INTRAMUSCULAR; INTRAVENOUS ONCE
Status: COMPLETED | OUTPATIENT
Start: 2019-08-21 | End: 2019-08-21

## 2019-08-21 RX ADMIN — KETOROLAC TROMETHAMINE 30 MG: 30 INJECTION, SOLUTION INTRAMUSCULAR; INTRAVENOUS at 14:23

## 2019-08-21 ASSESSMENT — ENCOUNTER SYMPTOMS
ABDOMINAL PAIN: 0
BACK PAIN: 1
BLOOD IN STOOL: 0
SHORTNESS OF BREATH: 0
CHEST TIGHTNESS: 0
TROUBLE SWALLOWING: 0

## 2019-08-21 NOTE — PROGRESS NOTES
TempSrc: Temporal   SpO2: 97%   Weight: 214 lb 3.2 oz (97.2 kg)   Height: 5' 8\" (1.727 m)       Physical Exam   Cardiovascular: Normal rate, regular rhythm and normal heart sounds. Pulmonary/Chest: Effort normal and breath sounds normal.   Musculoskeletal: She exhibits tenderness (lower lumbar). slr neg ---dtr's 1/4 right patellar  2/4 left patellar             Assessment and Plan:  Marianne Quiroz was seen today for back pain and numbness. Diagnoses and all orders for this visit:    Low back pain without sciatica, unspecified back pain laterality, unspecified chronicity  Comments:  mri ordered- had therapy may 2018--had epidurals /ablations--rt leg giving out on her at times--toradol 30 mg im --prednisone taper  Orders:  -     predniSONE (DELTASONE) 10 MG tablet; 3 PO QDX3D, 2 PO QDX3D, 1 PO QDX3D, .5 PO QDX3D  -     MRI LUMBAR SPINE W CONTRAST; Future  -     ketorolac (TORADOL) injection 30 mg    Type 2 diabetes mellitus without complication, with long-term current use of insulin ([facility])  Comments:  bs will elevate with prednisone--watch diet closely        No follow-ups on file. This note has been transcribed by Artemio Spann under the direction of Dr. Kylie Lynch. Dr. Halley Krishna has reviewed this note for accuracy. I, Dr. Halley Krishna, personally performed the services described in this documentation as scribed by Artemio Spann in my presence, and it is both accurate and complete.     Seen By:  Karishma Gardiner DO

## 2019-08-22 DIAGNOSIS — M45.6 ANKYLOSING SPONDYLITIS OF LUMBAR REGION (HCC): Primary | ICD-10-CM

## 2019-08-29 ENCOUNTER — TELEPHONE (OUTPATIENT)
Dept: FAMILY MEDICINE CLINIC | Age: 59
End: 2019-08-29

## 2019-08-29 DIAGNOSIS — I10 ESSENTIAL HYPERTENSION: Primary | ICD-10-CM

## 2019-08-29 NOTE — TELEPHONE ENCOUNTER
Jennifer Ramsey is scheduled for an MRI on Sunday at [facility]. They need to have a Bun & Creatinine drawn a two days before that. Can you put in the order for that. If you do, they can get it from [employer].

## 2019-08-30 ENCOUNTER — HOSPITAL ENCOUNTER (OUTPATIENT)
Age: 59
Discharge: HOME | End: 2019-08-30
Payer: COMMERCIAL

## 2019-08-30 DIAGNOSIS — E78.5 HYPERLIPIDEMIA, UNSPECIFIED HYPERLIPIDEMIA TYPE: ICD-10-CM

## 2019-08-30 DIAGNOSIS — I10 ESSENTIAL HYPERTENSION: ICD-10-CM

## 2019-08-30 LAB
ANION GAP SERPL CALCULATED.3IONS-SCNC: 11 MMOL/L (ref 7–16)
BUN BLDV-MCNC: 19 MG/DL (ref 8–23)
CALCIUM SERPL-MCNC: 9.8 MG/DL (ref 8.6–10.2)
CHLORIDE BLD-SCNC: 95 MMOL/L (ref 98–107)
CHOLESTEROL, TOTAL: 222 MG/DL (ref 0–199)
CO2: 26 MMOL/L (ref 22–29)
CREAT SERPL-MCNC: 1.2 MG/DL (ref 0.5–1)
GFR AFRICAN AMERICAN: 55
GFR NON-AFRICAN AMERICAN: 46 ML/MIN/1.73
GLUCOSE BLD-MCNC: 398 MG/DL (ref 74–99)
HDLC SERPL-MCNC: 57 MG/DL
LDL CHOLESTEROL CALCULATED: 148 MG/DL (ref 0–99)
POTASSIUM SERPL-SCNC: 5.1 MMOL/L (ref 3.5–5)
SODIUM BLD-SCNC: 132 MMOL/L (ref 132–146)
TRIGL SERPL-MCNC: 87 MG/DL (ref 0–149)
VLDLC SERPL CALC-MCNC: 17 MG/DL

## 2019-08-30 PROCEDURE — 36415 COLL VENOUS BLD VENIPUNCTURE: CPT

## 2019-08-30 PROCEDURE — 80048 BASIC METABOLIC PNL TOTAL CA: CPT

## 2019-08-30 PROCEDURE — 80061 LIPID PANEL: CPT

## 2019-09-01 ENCOUNTER — HOSPITAL ENCOUNTER (OUTPATIENT)
Dept: MRI IMAGING | Age: 59
Discharge: HOME | End: 2019-09-03
Payer: COMMERCIAL

## 2019-09-01 DIAGNOSIS — M54.50 LOW BACK PAIN WITHOUT SCIATICA, UNSPECIFIED BACK PAIN LATERALITY, UNSPECIFIED CHRONICITY: ICD-10-CM

## 2019-09-01 DIAGNOSIS — M45.6 ANKYLOSING SPONDYLITIS OF LUMBAR REGION (HCC): ICD-10-CM

## 2019-09-01 PROCEDURE — 6360000004 HC RX CONTRAST MEDICATION: Performed by: RADIOLOGY

## 2019-09-01 PROCEDURE — 72158 MRI LUMBAR SPINE W/O & W/DYE: CPT

## 2019-09-01 PROCEDURE — A9579 GAD-BASE MR CONTRAST NOS,1ML: HCPCS | Performed by: RADIOLOGY

## 2019-09-01 RX ADMIN — GADOTERIDOL 18 ML: 279.3 INJECTION, SOLUTION INTRAVENOUS at 10:15

## 2019-09-04 ENCOUNTER — TELEPHONE (OUTPATIENT)
Dept: FAMILY MEDICINE CLINIC | Age: 59
End: 2019-09-04

## 2019-09-04 DIAGNOSIS — M54.16 LUMBAR RADICULOPATHY: Primary | ICD-10-CM

## 2019-09-11 ENCOUNTER — TELEPHONE (OUTPATIENT)
Dept: FAMILY MEDICINE CLINIC | Age: 59
End: 2019-09-11

## 2019-09-11 DIAGNOSIS — M25.551 PAIN OF RIGHT HIP JOINT: Primary | ICD-10-CM

## 2019-09-17 ENCOUNTER — TELEPHONE (OUTPATIENT)
Dept: FAMILY MEDICINE CLINIC | Age: 59
End: 2019-09-17

## 2019-11-06 ENCOUNTER — OFFICE VISIT (OUTPATIENT)
Dept: FAMILY MEDICINE CLINIC | Age: 59
End: 2019-11-06
Payer: COMMERCIAL

## 2019-11-06 VITALS
SYSTOLIC BLOOD PRESSURE: 112 MMHG | BODY MASS INDEX: 29.65 KG/M2 | DIASTOLIC BLOOD PRESSURE: 68 MMHG | OXYGEN SATURATION: 96 % | TEMPERATURE: 97.2 F | HEART RATE: 116 BPM | WEIGHT: 195 LBS

## 2019-11-06 DIAGNOSIS — L23.7 POISON IVY DERMATITIS: ICD-10-CM

## 2019-11-06 DIAGNOSIS — J01.90 ACUTE NON-RECURRENT SINUSITIS, UNSPECIFIED LOCATION: Primary | ICD-10-CM

## 2019-11-06 PROCEDURE — 99213 OFFICE O/P EST LOW 20 MIN: CPT | Performed by: FAMILY MEDICINE

## 2019-11-06 RX ORDER — AMOXICILLIN AND CLAVULANATE POTASSIUM 875; 125 MG/1; MG/1
1 TABLET, FILM COATED ORAL 2 TIMES DAILY
Qty: 20 TABLET | Refills: 0 | Status: SHIPPED | OUTPATIENT
Start: 2019-11-06 | End: 2019-11-16

## 2019-11-06 RX ORDER — PREDNISONE 10 MG/1
TABLET ORAL
Qty: 18 TABLET | Refills: 0 | Status: SHIPPED | OUTPATIENT
Start: 2019-11-06 | End: 2019-11-15

## 2019-11-06 ASSESSMENT — ENCOUNTER SYMPTOMS
COUGH: 1
SINUS PRESSURE: 1
EYE PAIN: 0
CONSTIPATION: 0
ABDOMINAL PAIN: 0
SORE THROAT: 0
BACK PAIN: 0
DIARRHEA: 0
SINUS PAIN: 0
SHORTNESS OF BREATH: 0

## 2019-11-13 ASSESSMENT — ENCOUNTER SYMPTOMS
BLOOD IN STOOL: 0
ABDOMINAL PAIN: 0
CHEST TIGHTNESS: 0
SHORTNESS OF BREATH: 0
TROUBLE SWALLOWING: 0

## 2019-11-14 ENCOUNTER — OFFICE VISIT (OUTPATIENT)
Dept: FAMILY MEDICINE CLINIC | Age: 59
End: 2019-11-14
Payer: COMMERCIAL

## 2019-11-14 ENCOUNTER — HOSPITAL ENCOUNTER (OUTPATIENT)
Age: 59
Discharge: HOME | End: 2019-11-16
Payer: COMMERCIAL

## 2019-11-14 VITALS
HEART RATE: 111 BPM | BODY MASS INDEX: 29.33 KG/M2 | TEMPERATURE: 97.1 F | OXYGEN SATURATION: 93 % | DIASTOLIC BLOOD PRESSURE: 70 MMHG | SYSTOLIC BLOOD PRESSURE: 120 MMHG | HEIGHT: 69 IN | WEIGHT: 198 LBS

## 2019-11-14 DIAGNOSIS — E03.9 HYPOTHYROIDISM, UNSPECIFIED TYPE: ICD-10-CM

## 2019-11-14 DIAGNOSIS — I10 ESSENTIAL HYPERTENSION: ICD-10-CM

## 2019-11-14 DIAGNOSIS — E78.2 MIXED HYPERLIPIDEMIA: ICD-10-CM

## 2019-11-14 DIAGNOSIS — Z79.4 TYPE 2 DIABETES MELLITUS WITH OTHER SPECIFIED COMPLICATION, WITH LONG-TERM CURRENT USE OF INSULIN (HCC): ICD-10-CM

## 2019-11-14 DIAGNOSIS — E11.69 TYPE 2 DIABETES MELLITUS WITH OTHER SPECIFIED COMPLICATION, WITH LONG-TERM CURRENT USE OF INSULIN (HCC): ICD-10-CM

## 2019-11-14 DIAGNOSIS — I10 ESSENTIAL HYPERTENSION: Primary | ICD-10-CM

## 2019-11-14 DIAGNOSIS — Z23 NEED FOR INFLUENZA VACCINATION: ICD-10-CM

## 2019-11-14 DIAGNOSIS — M54.16 LUMBAR RADICULOPATHY: ICD-10-CM

## 2019-11-14 DIAGNOSIS — J01.90 ACUTE SINUSITIS, RECURRENCE NOT SPECIFIED, UNSPECIFIED LOCATION: ICD-10-CM

## 2019-11-14 LAB
ALBUMIN SERPL-MCNC: 4 G/DL (ref 3.5–5.2)
ALP BLD-CCNC: 81 U/L (ref 35–104)
ALT SERPL-CCNC: 15 U/L (ref 0–32)
ANION GAP SERPL CALCULATED.3IONS-SCNC: 15 MMOL/L (ref 7–16)
AST SERPL-CCNC: 16 U/L (ref 0–31)
BILIRUB SERPL-MCNC: 0.7 MG/DL (ref 0–1.2)
BUN BLDV-MCNC: 19 MG/DL (ref 8–23)
CALCIUM SERPL-MCNC: 9.5 MG/DL (ref 8.6–10.2)
CHLORIDE BLD-SCNC: 99 MMOL/L (ref 98–107)
CO2: 21 MMOL/L (ref 22–29)
CREAT SERPL-MCNC: 1.2 MG/DL (ref 0.5–1)
GFR AFRICAN AMERICAN: 55
GFR NON-AFRICAN AMERICAN: 46 ML/MIN/1.73
GLUCOSE BLD-MCNC: 197 MG/DL (ref 74–99)
HBA1C MFR BLD: 10 % (ref 4–5.6)
POTASSIUM SERPL-SCNC: 4.8 MMOL/L (ref 3.5–5)
SODIUM BLD-SCNC: 135 MMOL/L (ref 132–146)
T4 FREE: 1.82 NG/DL (ref 0.93–1.7)
TOTAL PROTEIN: 7.2 G/DL (ref 6.4–8.3)
TSH SERPL DL<=0.05 MIU/L-ACNC: 3.7 UIU/ML (ref 0.27–4.2)

## 2019-11-14 PROCEDURE — 83036 HEMOGLOBIN GLYCOSYLATED A1C: CPT

## 2019-11-14 PROCEDURE — 80053 COMPREHEN METABOLIC PANEL: CPT

## 2019-11-14 PROCEDURE — 90471 IMMUNIZATION ADMIN: CPT | Performed by: FAMILY MEDICINE

## 2019-11-14 PROCEDURE — 36415 COLL VENOUS BLD VENIPUNCTURE: CPT

## 2019-11-14 PROCEDURE — 84439 ASSAY OF FREE THYROXINE: CPT

## 2019-11-14 PROCEDURE — 90686 IIV4 VACC NO PRSV 0.5 ML IM: CPT | Performed by: FAMILY MEDICINE

## 2019-11-14 PROCEDURE — 84443 ASSAY THYROID STIM HORMONE: CPT

## 2019-11-14 PROCEDURE — 99213 OFFICE O/P EST LOW 20 MIN: CPT | Performed by: FAMILY MEDICINE

## 2019-11-14 RX ORDER — AZITHROMYCIN 250 MG/1
250 TABLET, FILM COATED ORAL SEE ADMIN INSTRUCTIONS
Qty: 6 TABLET | Refills: 1 | Status: SHIPPED | OUTPATIENT
Start: 2019-11-14 | End: 2019-11-19

## 2019-11-14 ASSESSMENT — ENCOUNTER SYMPTOMS
SINUS PAIN: 1
BACK PAIN: 1

## 2019-11-25 ENCOUNTER — TELEPHONE (OUTPATIENT)
Dept: FAMILY MEDICINE CLINIC | Age: 59
End: 2019-11-25

## 2019-12-04 ENCOUNTER — OFFICE VISIT (OUTPATIENT)
Dept: ENDOCRINOLOGY | Age: 59
End: 2019-12-04
Payer: COMMERCIAL

## 2019-12-04 VITALS
BODY MASS INDEX: 30.07 KG/M2 | OXYGEN SATURATION: 99 % | WEIGHT: 198.4 LBS | HEIGHT: 68 IN | HEART RATE: 91 BPM | RESPIRATION RATE: 16 BRPM | DIASTOLIC BLOOD PRESSURE: 76 MMHG | SYSTOLIC BLOOD PRESSURE: 126 MMHG

## 2019-12-04 DIAGNOSIS — E10.8 TYPE 1 DIABETES MELLITUS WITH COMPLICATION (HCC): ICD-10-CM

## 2019-12-04 DIAGNOSIS — E03.9 HYPOTHYROIDISM, UNSPECIFIED TYPE: ICD-10-CM

## 2019-12-04 PROCEDURE — 99214 OFFICE O/P EST MOD 30 MIN: CPT | Performed by: INTERNAL MEDICINE

## 2019-12-04 RX ORDER — GLUCOSAMINE HCL/CHONDROITIN SU 500-400 MG
CAPSULE ORAL
Qty: 250 STRIP | Refills: 5 | Status: SHIPPED
Start: 2019-12-04 | End: 2020-02-11

## 2019-12-04 RX ORDER — LANCETS
EACH MISCELLANEOUS
Qty: 250 EACH | Refills: 5 | Status: SHIPPED
Start: 2019-12-04 | End: 2020-02-11

## 2019-12-04 RX ORDER — LEVOTHYROXINE SODIUM 137 UG/1
137 CAPSULE ORAL DAILY
Qty: 90 CAPSULE | Refills: 3 | Status: SHIPPED
Start: 2019-12-04 | End: 2020-04-29 | Stop reason: SDUPTHER

## 2019-12-09 DIAGNOSIS — E10.8 TYPE 1 DIABETES MELLITUS WITH COMPLICATION (HCC): Primary | ICD-10-CM

## 2019-12-09 RX ORDER — FLASH GLUCOSE SENSOR
KIT MISCELLANEOUS
Qty: 2 EACH | Refills: 5 | Status: SHIPPED
Start: 2019-12-09 | End: 2020-02-11

## 2019-12-09 RX ORDER — FLASH GLUCOSE SCANNING READER
EACH MISCELLANEOUS
Qty: 1 DEVICE | Refills: 1 | Status: SHIPPED
Start: 2019-12-09 | End: 2020-02-11

## 2019-12-11 ENCOUNTER — TELEPHONE (OUTPATIENT)
Dept: ENDOCRINOLOGY | Age: 59
End: 2019-12-11

## 2019-12-19 ENCOUNTER — TELEPHONE (OUTPATIENT)
Dept: ENDOCRINOLOGY | Age: 59
End: 2019-12-19

## 2020-01-03 ENCOUNTER — HOSPITAL ENCOUNTER (OUTPATIENT)
Dept: DIABETES SERVICES | Age: 60
Setting detail: THERAPIES SERIES
Discharge: HOME | End: 2020-01-03
Payer: COMMERCIAL

## 2020-01-03 ENCOUNTER — TELEPHONE (OUTPATIENT)
Dept: FAMILY MEDICINE CLINIC | Age: 60
End: 2020-01-03

## 2020-01-03 PROCEDURE — G0108 DIAB MANAGE TRN  PER INDIV: HCPCS

## 2020-01-03 SDOH — ECONOMIC STABILITY: FOOD INSECURITY: ADDITIONAL INFORMATION: NO

## 2020-01-03 ASSESSMENT — PATIENT HEALTH QUESTIONNAIRE - PHQ9
1. LITTLE INTEREST OR PLEASURE IN DOING THINGS: 0
SUM OF ALL RESPONSES TO PHQ QUESTIONS 1-9: 0
SUM OF ALL RESPONSES TO PHQ9 QUESTIONS 1 & 2: 0
2. FEELING DOWN, DEPRESSED OR HOPELESS: 0
SUM OF ALL RESPONSES TO PHQ QUESTIONS 1-9: 0

## 2020-01-03 NOTE — TELEPHONE ENCOUNTER
Pharmacy calling in. They state we sent over a humalog script on 6/23 with no max dosage so they were calling for clarification on that. I did notice most of her diabetic medications are being prescribed by [facility] now. I advised them to follow up with that doctor but they are asking if you are following patient's humalog scripts or if they should cancel the one they have from you?   Please advise, thank you

## 2020-01-30 PROBLEM — M79.7 FIBROMYALGIA: Status: ACTIVE | Noted: 2020-01-30

## 2020-01-30 PROBLEM — G89.29 CHRONIC LOW BACK PAIN WITHOUT SCIATICA: Status: ACTIVE | Noted: 2020-01-30

## 2020-01-30 PROBLEM — M54.50 CHRONIC LOW BACK PAIN WITHOUT SCIATICA: Status: ACTIVE | Noted: 2020-01-30

## 2020-02-10 ENCOUNTER — TELEPHONE (OUTPATIENT)
Dept: ENDOCRINOLOGY | Age: 60
End: 2020-02-10

## 2020-02-10 RX ORDER — NAPROXEN SODIUM 220 MG
1 TABLET ORAL DAILY
Qty: 100 EACH | Refills: 3 | Status: SHIPPED
Start: 2020-02-10 | End: 2020-02-11

## 2020-02-10 NOTE — PROGRESS NOTES
2/10/20  Ninfa Naranjo : 1960 Sex: female  Age: 62 y.o. Chief Complaint   Patient presents with    Hypertension    Hyperlipidemia    Diabetes    Forms     disability        HPI:  Patient presents for recheck of hypothyroid, hypertension,  Diabetes and hyperlipidemia. Patient continues to have lumbar, cervical and fibromyalgia pain. Patient has been following [facility]. She is here today to fill out forms for social security. She is unable to work due her limitations. She is insulin dependent diabetic. She had neuropathy in her arms and feet. She is unable to walk long periods. She had trouble holding a pen due to the pain and numbness of the hand. Denies chest pain, edema, fatigue, palpitations and syncope. Compliance reviewed. No medication side effects noted. Review of Systems   Constitutional: Negative for appetite change, fatigue and unexpected weight change. HENT: Negative for congestion and trouble swallowing. Eyes: Negative for visual disturbance. Respiratory: Negative for chest tightness and shortness of breath. Cardiovascular: Negative for chest pain and leg swelling. Gastrointestinal: Negative for abdominal pain and blood in stool. Endocrine: Negative for cold intolerance, heat intolerance and polyuria. Genitourinary: Negative for difficulty urinating, hematuria and menstrual problem. Musculoskeletal: Positive for arthralgias, back pain, myalgias, neck pain and neck stiffness. Negative for gait problem and joint swelling. Allergic/Immunologic: Negative for environmental allergies and food allergies. Neurological: Negative for syncope, weakness, light-headedness and numbness. Hematological: Negative for adenopathy. Psychiatric/Behavioral: Negative for suicidal ideas. The patient is not nervous/anxious.          NOT DEPRESSED         Current Outpatient Medications:     omeprazole (PRILOSEC) 20 MG delayed release capsule, Take 1 capsule by mouth daily, Disp: 90 capsule, Rfl: 1    losartan (COZAAR) 50 MG tablet, Take 1 tablet by mouth daily, Disp: 90 tablet, Rfl: 1    atorvastatin (LIPITOR) 20 MG tablet, Take 1 tablet by mouth nightly, Disp: 90 tablet, Rfl: 1    Levothyroxine Sodium 137 MCG CAPS, Take 137 mcg by mouth Daily, Disp: 90 capsule, Rfl: 3    insulin glargine (LANTUS) 100 UNIT/ML injection vial, Inject 35 Units into the skin every morning (Patient taking differently: Inject 44 Units into the skin every morning ), Disp: 12 vial, Rfl: 5    insulin lispro (HUMALOG KWIKPEN) 100 UNIT/ML pen, Take 12 units before meals + sliding scale.  MAX 75 units a day, Disp: 20 pen, Rfl: 5    Cholecalciferol (VITAMIN D3) 1000 units CAPS, Take by mouth, Disp: , Rfl:   Allergies   Allergen Reactions    Adhesive Tape     Sulfa Antibiotics Hives, Itching and Other (See Comments)     Big time redness    Keflex [Cephalexin] Itching and Rash       Past Medical History:   Diagnosis Date    Spain's esophagus     Chronic back pain     Constipation     Diarrhea     High cholesterol     Hypertension     Hyperthyroidism     Hypothyroidism     Neuropathy     Osteoarthritis     Type 2 diabetes mellitus without complication (HCC)      Past Surgical History:   Procedure Laterality Date    COLONOSCOPY  2015    DILATION AND CURETTAGE      FOOT SURGERY      x3    [place] COLONOSCOPY FLX DX W/COLLJ SPEC WHEN PFRMD N/A 10/8/2018    COLONOSCOPY SCREENING POSSIBLE BIOPSY POSSIBLE POLYPECTOMY POSSIBLE CLIPPING performed [facility] at [facility]  2015    UPPER GASTROINTESTINAL ENDOSCOPY N/A 10/8/2018    EGD BIOPSY performed by [facility]ago [facility]ello MD at [facility] ENDOSCOPY     Family History   Problem Relation Age of Onset   Arty Mom Breast Cancer Mother     Diabetes Mother     High Blood Pressure Mother     Diabetes Father     Breast Cancer Sister    Arty Mom Other Pupils: Pupils are equal, round, and reactive to light. Neck:      Musculoskeletal: Muscular tenderness present. Thyroid: No thyromegaly. Cardiovascular:      Rate and Rhythm: Normal rate and regular rhythm. Heart sounds: Normal heart sounds. Pulmonary:      Effort: Pulmonary effort is normal.      Breath sounds: Normal breath sounds. Abdominal:      General: Bowel sounds are normal.      Palpations: Abdomen is soft. There is no mass. Tenderness: There is no abdominal tenderness. Musculoskeletal:         General: Tenderness (lumbar, right shoulder and cervical pain) present. Cervical back: She exhibits decreased range of motion, tenderness and pain. Lumbar back: She exhibits tenderness and pain. Comments:  strength diminished rt hand   Skin:     General: Skin is warm and dry. Findings: No rash. Neurological:      Mental Status: She is alert and oriented to person, place, and time. Cranial Nerves: No cranial nerve deficit. Sensory: No sensory deficit. Motor: No weakness. Deep Tendon Reflexes: Reflexes abnormal (diminished patellar --achilles absent). Psychiatric:         Mood and Affect: Mood normal.         Behavior: Behavior normal.         Thought Content: Thought content normal.               Assessment and Plan:  Radha Barajas was seen today for hypertension, hyperlipidemia, diabetes and forms. Diagnoses and all orders for this visit:    Lumbar radiculopathy  Comments:  -follows [facility]    Fibromyalgia  Comments:  continues to have daily pain     Cervical radiculopathy  Comments:  -tenderness, decreased range of motion, follows Dr. Radha Phillips physical medicine.     Neuropathy    Type 2 diabetes mellitus with other specified complication, with long-term current use of insulin ([facility])  Comments:  -stable  -continue insulin instructions  -continue to watch carbs and sugars    Essential hypertension  Comments:  stable   Orders:  - losartan (COZAAR) 50 MG tablet; Take 1 tablet by mouth daily    Hypothyroidism, unspecified type  Comments:  -continue Levothyroxine    Spain's esophagus with dysplasia  Comments:  -continue Omeprazole  Orders:  -     omeprazole (PRILOSEC) 20 MG delayed release capsule; Take 1 capsule by mouth daily    Mixed hyperlipidemia  Comments:  -stable  Orders:  -     atorvastatin (LIPITOR) 20 MG tablet; Take 1 tablet by mouth nightly              No follow-ups on file. Seen By:  Anita Saleh,         This note has been transcribed by Yareli Hammond under the direction of Dr. Fatimah Saleh. I, Dr. rBittani Mckeon, personally performed the services described in this documentation as scribed by Yareli Hammond in my presence, and it is both accurate and complete.

## 2020-02-11 ENCOUNTER — OFFICE VISIT (OUTPATIENT)
Dept: FAMILY MEDICINE CLINIC | Age: 60
End: 2020-02-11
Payer: COMMERCIAL

## 2020-02-11 VITALS
OXYGEN SATURATION: 98 % | TEMPERATURE: 98 F | WEIGHT: 202.4 LBS | SYSTOLIC BLOOD PRESSURE: 130 MMHG | RESPIRATION RATE: 16 BRPM | DIASTOLIC BLOOD PRESSURE: 72 MMHG | BODY MASS INDEX: 29.89 KG/M2 | HEART RATE: 92 BPM

## 2020-02-11 PROCEDURE — 99213 OFFICE O/P EST LOW 20 MIN: CPT | Performed by: FAMILY MEDICINE

## 2020-02-11 RX ORDER — LOSARTAN POTASSIUM 50 MG/1
50 TABLET ORAL DAILY
Qty: 90 TABLET | Refills: 1 | Status: SHIPPED
Start: 2020-02-11 | End: 2020-04-29 | Stop reason: SDUPTHER

## 2020-02-11 RX ORDER — ATORVASTATIN CALCIUM 20 MG/1
20 TABLET, FILM COATED ORAL NIGHTLY
Qty: 90 TABLET | Refills: 1 | Status: SHIPPED
Start: 2020-02-11 | End: 2020-04-29 | Stop reason: SDUPTHER

## 2020-02-11 RX ORDER — OMEPRAZOLE 20 MG/1
20 CAPSULE, DELAYED RELEASE ORAL DAILY
Qty: 90 CAPSULE | Refills: 1 | Status: SHIPPED
Start: 2020-02-11 | End: 2020-04-29 | Stop reason: SDUPTHER

## 2020-02-11 ASSESSMENT — ENCOUNTER SYMPTOMS: BACK PAIN: 1

## 2020-02-13 ENCOUNTER — TELEPHONE (OUTPATIENT)
Dept: FAMILY MEDICINE CLINIC | Age: 60
End: 2020-02-13

## 2020-02-13 NOTE — TELEPHONE ENCOUNTER
Received message patient calling in asking about disability paperwork to be filled out. Returned call and left message notifying patient that paperwork was completed and would be at office for her to . If she would like it mailed instead to call and let office staff know.

## 2020-03-27 ENCOUNTER — FOLLOWUP TELEPHONE ENCOUNTER (OUTPATIENT)
Dept: DIABETES SERVICES | Age: 60
End: 2020-03-27

## 2020-03-27 NOTE — PROGRESS NOTES
Patient's diabetes education assessment was completed on January 3rd, but patient did not show for her scheduled diabetes education classes and has not called us to reschedule. Patient knows she may reschedule her classes at any time.  Hiral Ayala RN, BSN, CDE

## 2020-04-09 RX ORDER — INSULIN GLARGINE 100 [IU]/ML
38 INJECTION, SOLUTION SUBCUTANEOUS EVERY MORNING
Qty: 4 VIAL | Refills: 3 | Status: SHIPPED | OUTPATIENT
Start: 2020-04-09

## 2020-04-29 RX ORDER — LOSARTAN POTASSIUM 50 MG/1
50 TABLET ORAL DAILY
Qty: 90 TABLET | Refills: 1 | Status: SHIPPED | OUTPATIENT
Start: 2020-04-29

## 2020-04-29 RX ORDER — ATORVASTATIN CALCIUM 20 MG/1
20 TABLET, FILM COATED ORAL NIGHTLY
Qty: 90 TABLET | Refills: 1 | Status: SHIPPED | OUTPATIENT
Start: 2020-04-29

## 2020-04-29 RX ORDER — OMEPRAZOLE 20 MG/1
20 CAPSULE, DELAYED RELEASE ORAL DAILY
Qty: 90 CAPSULE | Refills: 1 | Status: SHIPPED | OUTPATIENT
Start: 2020-04-29

## 2020-04-29 RX ORDER — LEVOTHYROXINE SODIUM 137 UG/1
137 CAPSULE ORAL DAILY
Qty: 90 CAPSULE | Refills: 1 | Status: SHIPPED | OUTPATIENT
Start: 2020-04-29

## 2020-04-29 NOTE — TELEPHONE ENCOUNTER
Last Appointment:  2/11/2020  Future Appointments   Date Time Provider Cortney Barger   8/4/2020  8:00 AM DO AGUSTO Cedeno [facility]      Medications pended

## 2020-10-15 ENCOUNTER — OFFICE VISIT (OUTPATIENT)
Dept: PRIMARY CARE CLINIC | Age: 60
End: 2020-10-15
Payer: COMMERCIAL

## 2020-10-15 ENCOUNTER — HOSPITAL ENCOUNTER (OUTPATIENT)
Age: 60
Discharge: HOME | End: 2020-10-17
Payer: COMMERCIAL

## 2020-10-15 VITALS
TEMPERATURE: 98.2 F | SYSTOLIC BLOOD PRESSURE: 132 MMHG | HEIGHT: 67 IN | WEIGHT: 204 LBS | RESPIRATION RATE: 16 BRPM | HEART RATE: 89 BPM | BODY MASS INDEX: 32.02 KG/M2 | DIASTOLIC BLOOD PRESSURE: 88 MMHG | OXYGEN SATURATION: 99 %

## 2020-10-15 PROCEDURE — 99214 OFFICE O/P EST MOD 30 MIN: CPT | Performed by: PHYSICIAN ASSISTANT

## 2020-10-15 PROCEDURE — U0003 INFECTIOUS AGENT DETECTION BY NUCLEIC ACID (DNA OR RNA); SEVERE ACUTE RESPIRATORY SYNDROME CORONAVIRUS 2 (SARS-COV-2) (CORONAVIRUS DISEASE [COVID-19]), AMPLIFIED PROBE TECHNIQUE, MAKING USE OF HIGH THROUGHPUT TECHNOLOGIES AS DESCRIBED BY CMS-2020-01-R: HCPCS

## 2020-10-15 RX ORDER — CHLORPHENIRAMINE MALEATE 4 MG/1
4 TABLET ORAL EVERY 6 HOURS PRN
Qty: 20 TABLET | Refills: 0 | Status: SHIPPED | OUTPATIENT
Start: 2020-10-15

## 2020-10-15 RX ORDER — AZITHROMYCIN 250 MG/1
250 TABLET, FILM COATED ORAL SEE ADMIN INSTRUCTIONS
Qty: 6 TABLET | Refills: 0 | Status: SHIPPED | OUTPATIENT
Start: 2020-10-15 | End: 2020-10-20

## 2020-10-15 RX ORDER — BENZONATATE 100 MG/1
100 CAPSULE ORAL 3 TIMES DAILY PRN
Qty: 21 CAPSULE | Refills: 0 | Status: SHIPPED | OUTPATIENT
Start: 2020-10-15 | End: 2020-10-22

## 2020-10-15 NOTE — PROGRESS NOTES
10/15/20  Konstantin Alexander : 1960 Sex: female  Age 62 y.o. Subjective:  Chief Complaint   Patient presents with    Headache     x1.5 weeks    Cough     productive, x2 days, SOB upon exertion.  Pharyngitis     x1 day post nasal drainage. Denies Fever. No loss of smell or taste. HPI:   Konstantin Alexander , 62 y.o. female presents to [facility] for evaluation of headache, cough, congestion, sore throat. The patient has had this headache ongoing for about a week and a half. The patient has had a cough ongoing for last 2 days. Patient does have some slight shortness of breath with exertion. The patient has noted some increased nasal congestion, rhinorrhea and postnasal drip. The patient denies fever. No loss of smell or taste. The patient is not having any fever, chills. The patient denies any recent exposures to COVID-19. The patient denies any abdominal pain, nausea, vomiting. ROS:   Unless otherwise stated in this report the patient's positive and negative responses for review of systems for constitutional, eyes, ENT, cardiovascular, respiratory, gastrointestinal, neurological, , musculoskeletal, and integument systems and related systems to the presenting problem are either stated in the history of present illness or were not pertinent or were negative for the symptoms and/or complaints related to the presenting medical problem. Positives and pertinent negatives as per HPI. All others reviewed and are negative.       PMH:     Past Medical History:   Diagnosis Date    [place]'s esophagus     Chronic back pain     Constipation     Diarrhea     High cholesterol     Hypertension     Hyperthyroidism     Hypothyroidism     Neuropathy     Osteoarthritis     Type 2 diabetes mellitus without complication ([facility] [address]        Past Surgical History:   Procedure Laterality Date    COLONOSCOPY  2015    DILATION AND CURETTAGE      FOOT SURGERY      x3    [place] COLONOSCOPY FLX DX W/COLLJ SPEC WHEN PFRMD N/A 10/8/2018    COLONOSCOPY SCREENING POSSIBLE BIOPSY POSSIBLE POLYPECTOMY POSSIBLE CLIPPING performed by Victor Manuel Spivey MD at [place] [address]    UPPER GASTROINTESTINAL ENDOSCOPY N/A 10/8/2018    EGD BIOPSY performed by Victor Manuel Spivey MD at [facility] ENDOSCOPY       Family History   Problem Relation Age of Onset   Jose Gomez Breast Cancer Mother     Diabetes Mother     High Blood Pressure Mother     Diabetes Father     Breast Cancer Sister     Other Brother     Diabetes Maternal Grandmother     Diabetes Paternal Cousin        Medications:     Current Outpatient Medications:     azithromycin (ZITHROMAX) 250 MG tablet, Take 1 tablet by mouth See Admin Instructions for 5 days 500mg on day 1 followed by 250mg on days 2 - 5, Disp: 6 tablet, Rfl: 0    benzonatate (TESSALON) 100 MG capsule, Take 1 capsule by mouth 3 times daily as needed for Cough, Disp: 21 capsule, Rfl: 0    chlorpheniramine (ALLER-CHLOR) 4 MG tablet, Take 1 tablet by mouth every 6 hours as needed for Allergies, Disp: 20 tablet, Rfl: 0    atorvastatin (LIPITOR) 20 MG tablet, Take 1 tablet by mouth nightly, Disp: 90 tablet, Rfl: 1    Levothyroxine Sodium 137 MCG CAPS, Take 137 mcg by mouth Daily, Disp: 90 capsule, Rfl: 1    losartan (COZAAR) 50 MG tablet, Take 1 tablet by mouth daily, Disp: 90 tablet, Rfl: 1    omeprazole (PRILOSEC) 20 MG delayed release capsule, Take 1 capsule by mouth daily, Disp: 90 capsule, Rfl: 1    insulin glargine (LANTUS) 100 UNIT/ML injection vial, Inject 38 Units into the skin every morning, Disp: 4 vial, Rfl: 3    ONE TOUCH ULTRA TEST strip, To test 4x/day, Disp: 400 each, Rfl: 3    insulin lispro (HUMALOG KWIKPEN) 100 UNIT/ML pen, Take 12 units before meals + sliding scale. MAX 75 units a day, Disp: 20 pen, Rfl: 5    Cholecalciferol (VITAMIN D3) 1000 units CAPS, Take by mouth, Disp: , Rfl:     Allergies:      Allergies   Allergen Reactions    Adhesive Tape     Sulfa Antibiotics Hives, Itching and Other (See Comments)     Big time redness    Keflex [Cephalexin] Itching and Rash       Social History:     Social History     Tobacco Use    Smoking status: Never Smoker    Smokeless tobacco: Never Used   Substance Use Topics    Alcohol use: No    Drug use: No       Patient lives at home. Physical Exam:     Vitals:    10/15/20 0957   BP: 132/88   Pulse: 89   Resp: 16   Temp: 98.2 °F (36.8 °C)   SpO2: 99%   Weight: 204 lb (92.5 kg)   Height: 5' 7\" (1.702 m)       Exam:  Physical Exam  Nurse's notes and vital signs reviewed. The patient is not hypoxic. ? General: Alert, no acute distress, patient resting comfortably Patient is not toxic or lethargic. Skin: Warm, intact, no pallor noted. There is no evidence of rash at this time. Head: Normocephalic, atraumatic  Eye: Normal conjunctiva  Ears, Nose, Throat: Right tympanic membrane clear, left tympanic membrane clear. No drainage or discharge noted. No pre- or post-auricular tenderness, erythema, or swelling noted. Nasal congestion, rhinorrhea, no epistaxis, no foreign body  Posterior oropharynx shows erythema and cobblestoning but no evidence of tonsillar hypertrophy, or exudate. the uvula is midline. No trismus or drooling is noted. Moist mucous membranes. Neck: No anterior/posterior lymphadenopathy noted. No erythema, no masses, no fluctuance or induration noted. No meningeal signs. Cardiovascular: Regular Rate and Rhythm  Respiratory: No acute distress, no rhonchi, wheezing or crackles noted. No stridor or retractions are noted. Neurological: A&O x4, normal speech  Psychiatric: Cooperative         Testing:           Medical Decision Making:     The patient has been seen and evaluated. The vital signs have been reviewed. The patient does not appear to be toxic or lethargic. Vital signs reviewed    Past medical history reviewed. Allergies reviewed.     Medications reviewed. The patient will have COVID-19 swab performed. The patient additionally will be started on a azithromycin, Tessalon Perles, chlorphentermine. The patient is to monitor symptoms closely. The patient is to push fluids. Quarantine and isolate until we can determine further testing results. The patient was educated on the proper dosage of motrin and tylenol and the appropriate intervals of each. The patient is to increase fluid intake over the next several days. The patient is to use OTC decongestant as needed. The patient is to return to express care or go directly to the emergency department should any of the signs or symptoms worsen. The patient is to followup with primary care physician in 2-3 days for repeat evaluation. The patient has no other questions or concerns at this time the patient will be discharged home. Clinical Impression:   Ramon Pena was seen today for headache, cough and pharyngitis. Diagnoses and all orders for this visit:    Suspected COVID-19 virus infection  -     COVID-19 Ambulatory; Future  -     azithromycin (ZITHROMAX) 250 MG tablet; Take 1 tablet by mouth See Admin Instructions for 5 days 500mg on day 1 followed by 250mg on days 2 - 5    Acute non-recurrent sinusitis, unspecified location    Sinus headache    Acute upper respiratory infection, unspecified    Cough    Other orders  -     benzonatate (TESSALON) 100 MG capsule; Take 1 capsule by mouth 3 times daily as needed for Cough  -     chlorpheniramine (ALLER-CHLOR) 4 MG tablet; Take 1 tablet by mouth every 6 hours as needed for Allergies        The patient is to call for any concerns or return if any of the signs or symptoms worsen. The patient is to follow-up with PCP in the next 2-3 days for repeat evaluation repeat assessment or go directly to the emergency department.      SIGNATURE: Jamey Monroy III, PA-C

## 2020-10-17 LAB
SARS-COV-2: NOT DETECTED
SOURCE: NORMAL

## 2020-12-10 ENCOUNTER — OFFICE VISIT (OUTPATIENT)
Dept: PRIMARY CARE CLINIC | Age: 60
End: 2020-12-10
Payer: COMMERCIAL

## 2020-12-10 VITALS
RESPIRATION RATE: 18 BRPM | HEART RATE: 84 BPM | TEMPERATURE: 97.5 F | WEIGHT: 200 LBS | SYSTOLIC BLOOD PRESSURE: 126 MMHG | OXYGEN SATURATION: 100 % | HEIGHT: 68 IN | DIASTOLIC BLOOD PRESSURE: 78 MMHG | BODY MASS INDEX: 30.31 KG/M2

## 2020-12-10 DIAGNOSIS — Z20.822 EXPOSURE TO COVID-19 VIRUS: ICD-10-CM

## 2020-12-10 LAB
Lab: NORMAL
QC PASS/FAIL: NORMAL
SARS-COV-2, POC: NORMAL

## 2020-12-10 PROCEDURE — 99213 OFFICE O/P EST LOW 20 MIN: CPT | Performed by: PHYSICIAN ASSISTANT

## 2020-12-10 PROCEDURE — 87426 SARSCOV CORONAVIRUS AG IA: CPT | Performed by: PHYSICIAN ASSISTANT

## 2020-12-10 RX ORDER — AZITHROMYCIN 250 MG/1
250 TABLET, FILM COATED ORAL SEE ADMIN INSTRUCTIONS
Qty: 6 TABLET | Refills: 0 | Status: SHIPPED | OUTPATIENT
Start: 2020-12-10 | End: 2020-12-15

## 2020-12-10 RX ORDER — BENZONATATE 100 MG/1
100 CAPSULE ORAL 3 TIMES DAILY PRN
Qty: 21 CAPSULE | Refills: 0 | Status: SHIPPED | OUTPATIENT
Start: 2020-12-10 | End: 2020-12-17

## 2020-12-10 RX ORDER — CHLORPHENIRAMINE MALEATE 4 MG/1
4 TABLET ORAL EVERY 6 HOURS PRN
Qty: 20 TABLET | Refills: 0 | Status: SHIPPED | OUTPATIENT
Start: 2020-12-10

## 2020-12-10 NOTE — PROGRESS NOTES
12/10/20  Elizabet Vick : 1960 Sex: female  Age 56 y.o. Subjective:  Chief Complaint   Patient presents with    Cough     cough, runny nose-  is covid positive          HPI:   Elizabet Vick , 56 y.o. female presents to express care for evaluation of cough, congestion, rhinorrhea. The patient has had the symptoms over the last couple days. Recently found out  has tested positive. The patient always has diarrhea. The patient is not having any fevers. The patient is not really having any abdominal pain, nausea, vomiting. The patient is not currently on any antibiotics. She has not been swabbed for COVID-19 previously. ROS:   Unless otherwise stated in this report the patient's positive and negative responses for review of systems for constitutional, eyes, ENT, cardiovascular, respiratory, gastrointestinal, neurological, , musculoskeletal, and integument systems and related systems to the presenting problem are either stated in the history of present illness or were not pertinent or were negative for the symptoms and/or complaints related to the presenting medical problem. Positives and pertinent negatives as per HPI. All others reviewed and are negative.       PMH:     Past Medical History:   Diagnosis Date    Spain's esophagus     Chronic back pain     Constipation     Diarrhea     High cholesterol     Hypertension     Hyperthyroidism     Hypothyroidism     Neuropathy     Osteoarthritis     Type 2 diabetes mellitus without complication ([facility] [address]        Past Surgical History:   Procedure Laterality Date    COLONOSCOPY      DILATION AND CURETTAGE      FOOT SURGERY      x3    [place] COLONOSCOPY FLX DX W/COLLJ SPEC WHEN PFRMD N/A 10/8/2018    COLONOSCOPY SCREENING POSSIBLE BIOPSY POSSIBLE POLYPECTOMY POSSIBLE CLIPPING performed [facility]le Diggs MD at [facility]      UPPER GASTROINTESTINAL ENDOSCOPY N/A 10/8/2018    EGD BIOPSY performed by Sebastián Moy MD at [facility] ENDOSCOPY       Family History   Problem Relation Age of Onset    Breast Cancer Mother     High Blood Pressure Mother     Breast Cancer Sister     Other Brother     Diabetes Maternal Grandmother     Diabetes Paternal Cousin        Medications:     Current Outpatient Medications:     chlorpheniramine (ALLER-CHLOR) 4 MG tablet, Take 1 tablet by mouth every 6 hours as needed for Allergies, Disp: 20 tablet, Rfl: 0    atorvastatin (LIPITOR) 20 MG tablet, Take 1 tablet by mouth nightly, Disp: 90 tablet, Rfl: 1    Levothyroxine Sodium 137 MCG CAPS, Take 137 mcg by mouth Daily, Disp: 90 capsule, Rfl: 1    losartan (COZAAR) 50 MG tablet, Take 1 tablet by mouth daily, Disp: 90 tablet, Rfl: 1    omeprazole (PRILOSEC) 20 MG delayed release capsule, Take 1 capsule by mouth daily, Disp: 90 capsule, Rfl: 1    insulin glargine (LANTUS) 100 UNIT/ML injection vial, Inject 38 Units into the skin every morning, Disp: 4 vial, Rfl: 3    ONE TOUCH ULTRA TEST strip, To test 4x/day, Disp: 400 each, Rfl: 3    insulin lispro (HUMALOG KWIKPEN) 100 UNIT/ML pen, Take 12 units before meals + sliding scale. MAX 75 units a day, Disp: 20 pen, Rfl: 5    Cholecalciferol (VITAMIN D3) 1000 units CAPS, Take by mouth, Disp: , Rfl:     Allergies: Allergies   Allergen Reactions    Adhesive Tape     Sulfa Antibiotics Hives, Itching and Other (See Comments)     Big time redness    Keflex [Cephalexin] Itching and Rash       Social History:     Social History     Tobacco Use    Smoking status: Never Smoker    Smokeless tobacco: Never Used   Substance Use Topics    Alcohol use: No    Drug use: No       Patient lives at home.     Physical Exam:     Vitals:    12/10/20 1055   BP: 126/78   Pulse: 84   Resp: 18   Temp: 97.5 °F (36.4 °C)   SpO2: 100%   Weight: 200 lb (90.7 kg)   Height: 5' 8\" (1.727 m)       Exam:  Physical Exam  Nurse's notes and vital signs reviewed. The patient is not hypoxic. ? General: Alert, no acute distress, patient resting comfortably Patient is not toxic or lethargic. Skin: Warm, intact, no pallor noted. There is no evidence of rash at this time. Head: Normocephalic, atraumatic  Eye: Normal conjunctiva  Ears, Nose, Throat: Right tympanic membrane clear, left tympanic membrane clear. No drainage or discharge noted. No pre- or post-auricular tenderness, erythema, or swelling noted. Slight congestion  Posterior oropharynx shows no erythema, tonsillar hypertrophy, or exudate. the uvula is midline. No trismus or drooling is noted. Moist mucous membranes. Neck: No anterior/posterior lymphadenopathy noted. No erythema, no masses, no fluctuance or induration noted. No meningeal signs. Cardiovascular: Regular Rate and Rhythm  Respiratory: No acute distress, no rhonchi, wheezing or crackles noted. No stridor or retractions are noted. Neurological: A&O x4, normal speech  Psychiatric: Cooperative         Testing:     Results for orders placed or performed in visit on 12/10/20   POCT COVID-19, Antigen   Result Value Ref Range    SARS-COV-2, POC Not-Detected Not Detected    Lot Number [id number]     QC Pass/Fail pass            Medical Decision Making:     The patient has been seen and evaluated. The vital signs have been reviewed. The patient does not appear to be toxic or lethargic. COVID-19 is pending at this time. Rapid test was negative in the office. We will treat the patient with chlorphentermine, Tessalon Perles and a azithromycin. The patient was educated on the proper dosage of motrin and tylenol and the appropriate intervals of each. The patient is to increase fluid intake over the next several days. The patient is to use OTC decongestant as needed. The patient is to return to express care or go directly to the emergency department should any of the signs or symptoms worsen.  The patient is to followup with primary care physician in 2-3 days for repeat evaluation. The patient has no other questions or concerns at this time the patient will be discharged home. Clinical Impression:   Sabine Mariee was seen today for cough. Diagnoses and all orders for this visit:    Exposure to COVID-19 virus  -     Covid-19 Ambulatory; Future  -     POCT COVID-19, Antigen  -     azithromycin (ZITHROMAX) 250 MG tablet; Take 1 tablet by mouth See Admin Instructions for 5 days 500mg on day 1 followed by 250mg on days 2 - 5    Acute upper respiratory infection, unspecified    Cough    Nasal congestion    Other orders  -     benzonatate (TESSALON) 100 MG capsule; Take 1 capsule by mouth 3 times daily as needed for Cough  -     chlorpheniramine (ALLER-CHLOR) 4 MG tablet; Take 1 tablet by mouth every 6 hours as needed for Allergies        The patient is to call for any concerns or return if any of the signs or symptoms worsen. The patient is to follow-up with PCP in the next 2-3 days for repeat evaluation repeat assessment or go directly to the emergency department.      SIGNATURE: Brian Christianson III, PA-C

## 2020-12-12 LAB
SARS-COV-2: NOT DETECTED
SOURCE: NORMAL

## 2023-04-26 ENCOUNTER — TELEPHONE (OUTPATIENT)
Dept: HEMATOLOGY | Age: 63
End: 2023-04-26

## 2023-04-26 NOTE — TELEPHONE ENCOUNTER
I tried to call the patient this morning to schedule her for a follow up per dr Shana Salmeron for downs esophagus, the patient is known to our office. When I tried the patients phone number we have listed I get a message that the patients voicemail is not set up to try at a later time.  I also tried to call the patients husbands phone but that is currently out of service, will try again at a later time  Electronically signed by Ignacio Pederson MA on 4/26/2023 at 9:07 AM

## 2023-04-28 ENCOUNTER — TELEPHONE (OUTPATIENT)
Dept: HEMATOLOGY | Age: 63
End: 2023-04-28

## 2023-04-28 NOTE — TELEPHONE ENCOUNTER
Dr Edmar Broussard office faxed a new patient referral over for the patient. I have tried contacting the patient on all numbers listed in her chart, with no response. I reached out to Dr Edmar Broussard office and spoke with [employer]. She gave me a few numbers they had for the patient [phone] and [phone] and both of these numbers are not in service.  I told [employer] that our office will put this referral on hold and once they can contact the patient they can call and schedule  Electronically signed by Ihsan Maynard MA on 4/28/2023 at 8:44 AM

## 2023-06-08 ENCOUNTER — TELEPHONE (OUTPATIENT)
Dept: HEMATOLOGY | Age: 63
End: 2023-06-08

## 2023-06-08 NOTE — TELEPHONE ENCOUNTER
Patient called in and cancelled her appt for 6/20/23 stating she had too many conflicting appts for this day. I offered to reschedule but she stated she will call us back at a later to reschedule.     Electronically signed by Jamel Salter RN on 6/8/2023 at 9:40 AM

## 2023-10-23 ENCOUNTER — TELEPHONE (OUTPATIENT)
Dept: HEMATOLOGY | Age: 63
End: 2023-10-23

## 2023-10-23 NOTE — TELEPHONE ENCOUNTER
The patient called and LVM that she wanted to make a appt with our office.  I called the patient back today and was not able to leave a message due to her voicemail not being set up  Electronically signed by Juan Carlos Pope MA on 10/23/2023 at 9:55 AM

## (undated) DEVICE — BLOCK BITE 60FR RUBBER ADLT DENTAL

## (undated) DEVICE — SPONGE GZ W4XL4IN RAYON POLY FILL CVR W/ NONWOVEN FAB

## (undated) DEVICE — GRADUATE TRIANG MEASURE 1000ML BLK PRNT